# Patient Record
Sex: FEMALE | Race: WHITE | URBAN - METROPOLITAN AREA
[De-identification: names, ages, dates, MRNs, and addresses within clinical notes are randomized per-mention and may not be internally consistent; named-entity substitution may affect disease eponyms.]

---

## 2018-06-05 ENCOUNTER — ED HISTORICAL/CONVERTED ENCOUNTER (OUTPATIENT)
Dept: OTHER | Age: 73
End: 2018-06-05

## 2019-01-18 ENCOUNTER — ED HISTORICAL/CONVERTED ENCOUNTER (OUTPATIENT)
Dept: OTHER | Age: 74
End: 2019-01-18

## 2019-01-23 ENCOUNTER — ED HISTORICAL/CONVERTED ENCOUNTER (OUTPATIENT)
Dept: OTHER | Age: 74
End: 2019-01-23

## 2021-03-24 ENCOUNTER — OFFICE VISIT (OUTPATIENT)
Dept: ENT CLINIC | Age: 76
End: 2021-03-24
Payer: MEDICARE

## 2021-03-24 ENCOUNTER — OFFICE VISIT (OUTPATIENT)
Dept: ENT CLINIC | Age: 76
End: 2021-03-24

## 2021-03-24 VITALS
TEMPERATURE: 97.5 F | HEART RATE: 83 BPM | DIASTOLIC BLOOD PRESSURE: 70 MMHG | RESPIRATION RATE: 18 BRPM | SYSTOLIC BLOOD PRESSURE: 130 MMHG | OXYGEN SATURATION: 96 % | WEIGHT: 137.4 LBS

## 2021-03-24 DIAGNOSIS — H93.13 TINNITUS OF BOTH EARS: ICD-10-CM

## 2021-03-24 DIAGNOSIS — B34.9 ACUTE VIRAL SYNDROME: ICD-10-CM

## 2021-03-24 DIAGNOSIS — H93.13 TINNITUS OF BOTH EARS: Primary | ICD-10-CM

## 2021-03-24 DIAGNOSIS — J20.8 VIRAL BRONCHITIS: ICD-10-CM

## 2021-03-24 DIAGNOSIS — H90.3 SENSORINEURAL HEARING LOSS (SNHL) OF BOTH EARS: ICD-10-CM

## 2021-03-24 PROCEDURE — G8400 PT W/DXA NO RESULTS DOC: HCPCS | Performed by: OTOLARYNGOLOGY

## 2021-03-24 PROCEDURE — 3017F COLORECTAL CA SCREEN DOC REV: CPT | Performed by: OTOLARYNGOLOGY

## 2021-03-24 PROCEDURE — 92557 COMPREHENSIVE HEARING TEST: CPT | Performed by: AUDIOLOGIST

## 2021-03-24 PROCEDURE — 92567 TYMPANOMETRY: CPT | Performed by: AUDIOLOGIST

## 2021-03-24 PROCEDURE — 1101F PT FALLS ASSESS-DOCD LE1/YR: CPT | Performed by: OTOLARYNGOLOGY

## 2021-03-24 PROCEDURE — G8510 SCR DEP NEG, NO PLAN REQD: HCPCS | Performed by: OTOLARYNGOLOGY

## 2021-03-24 PROCEDURE — G8427 DOCREV CUR MEDS BY ELIG CLIN: HCPCS | Performed by: OTOLARYNGOLOGY

## 2021-03-24 PROCEDURE — G8536 NO DOC ELDER MAL SCRN: HCPCS | Performed by: OTOLARYNGOLOGY

## 2021-03-24 PROCEDURE — 1090F PRES/ABSN URINE INCON ASSESS: CPT | Performed by: OTOLARYNGOLOGY

## 2021-03-24 PROCEDURE — G8421 BMI NOT CALCULATED: HCPCS | Performed by: OTOLARYNGOLOGY

## 2021-03-24 PROCEDURE — 99204 OFFICE O/P NEW MOD 45 MIN: CPT | Performed by: OTOLARYNGOLOGY

## 2021-03-24 RX ORDER — SIMVASTATIN 20 MG/1
20 TABLET, FILM COATED ORAL AT BEDTIME
COMMUNITY

## 2021-03-24 RX ORDER — GABAPENTIN 300 MG/1
300 CAPSULE ORAL 3 TIMES DAILY
COMMUNITY

## 2021-03-24 RX ORDER — OXYCODONE HYDROCHLORIDE 10 MG/1
10 TABLET ORAL
COMMUNITY
End: 2021-09-08

## 2021-03-24 RX ORDER — METOPROLOL SUCCINATE 50 MG/1
TABLET, EXTENDED RELEASE ORAL
COMMUNITY
Start: 2021-03-20

## 2021-03-24 RX ORDER — CALCIUM CARBONATE 600 MG
600 TABLET ORAL 2 TIMES DAILY
COMMUNITY

## 2021-03-24 RX ORDER — ESTRADIOL 0.5 MG/1
0.5 TABLET ORAL DAILY
COMMUNITY

## 2021-03-24 RX ORDER — LIDOCAINE 50 MG/G
PATCH TOPICAL
COMMUNITY
Start: 2021-03-12

## 2021-03-24 RX ORDER — NORTRIPTYLINE HYDROCHLORIDE 25 MG/1
25 CAPSULE ORAL AT BEDTIME
COMMUNITY

## 2021-03-24 RX ORDER — DICLOFENAC SODIUM 10 MG/G
GEL TOPICAL
COMMUNITY

## 2021-03-24 RX ORDER — LISINOPRIL 40 MG/1
TABLET ORAL
COMMUNITY
Start: 2021-01-12

## 2021-03-24 RX ORDER — HYDROCHLOROTHIAZIDE 12.5 MG/1
TABLET ORAL
COMMUNITY
Start: 2021-01-02 | End: 2021-09-08

## 2021-03-24 RX ORDER — FAMOTIDINE 20 MG/1
20 TABLET, FILM COATED ORAL 2 TIMES DAILY
COMMUNITY

## 2021-03-24 NOTE — LETTER
3/24/2021 Patient: Kristy Márquez YOB: 1945 Date of Visit: 3/24/2021 Alfonso Soto MD 
[address] [place] [address] [phone] Via Fax: [phone] Dear Alfonso Soto MD, Thank you for referring Ms. Cody Healy to [facility] for evaluation. My notes for this consultation are attached. If you have questions, please do not hesitate to call me. I look forward to following your patient along with you. Sincerely, Edith Carias MD

## 2021-03-24 NOTE — PROGRESS NOTES
Subjective:    Stephanie Cunha   78 y.o.   1945     New Patient Visit    Location - ear, throat    Quality - tinnitus, sore throat    Severity -  moderate    Duration - 3-4 days    Timing - acute onset (with throat)    Context - pt with longstanding roaring tinnitus, poor hearing; no noise exposure; states having sore throat and deep cough, not too productive; no tobacco use, + second hand smoke; ears burning    Modifying Features - ear worse with her recent symptoms    Associated symptoms/signs - neuropathy, restless legs      Review of Systems  Review of Systems   Constitutional: Negative for chills and fever. HENT: Positive for hearing loss, sinus pain, sore throat and tinnitus. Negative for ear pain and nosebleeds. Eyes: Negative for blurred vision and double vision. Respiratory: Positive for cough. Negative for sputum production and shortness of breath. Cardiovascular: Negative for chest pain and palpitations. Gastrointestinal: Negative for heartburn, nausea and vomiting. Musculoskeletal: Negative for joint pain and neck pain. Skin: Negative. Neurological: Negative for dizziness, speech change, weakness and headaches. Endo/Heme/Allergies: Negative for environmental allergies. Does not bruise/bleed easily. Psychiatric/Behavioral: Negative for memory loss. The patient does not have insomnia.           Past Medical History:   Diagnosis Date    Back pain     Herniated disc, cervical     Hyperlipemia     Hypertension     Osteoarthritis     Scoliosis     Shingles     Shoulder pain     Spinal stenosis     Torn meniscus     Vaginal prolapse     Vertigo      Past Surgical History:   Procedure Laterality Date    HX HYSTERECTOMY        Family History   Problem Relation Age of Onset    Cancer Mother     Heart Disease Brother      Social History     Tobacco Use    Smoking status: Never Smoker    Smokeless tobacco: Never Used   Substance Use Topics    Alcohol use: Not on file Prior to Admission medications    Medication Sig Start Date End Date Taking? Authorizing Provider   gabapentin (NEURONTIN) 300 mg capsule Take 300 mg by mouth three (3) times daily. Yes Provider, Historical   lisinopriL (PRINIVIL, ZESTRIL) 40 mg tablet  1/12/21  Yes Provider, Historical   simvastatin (ZOCOR) 20 mg tablet Take 20 mg by mouth At bedtime. Yes Provider, Historical   diclofenac (VOLTAREN) 1 % gel by TransDERmal route. Yes Provider, Historical   hydroCHLOROthiazide (HYDRODIURIL) 12.5 mg tablet  1/2/21  Yes Provider, Historical   lidocaine (LIDODERM) 5 %  3/12/21  Yes Provider, Historical   nortriptyline (PAMELOR) 25 mg capsule Take 25 mg by mouth At bedtime. Yes Provider, Historical   calcium carbonate (CALTREX) 600 mg calcium (1,500 mg) tablet Take 600 mg by mouth two (2) times a day. Yes Provider, Historical   oxyCODONE IR (ROXICODONE) 10 mg tab immediate release tablet Take 10 mg by mouth every six (6) hours as needed for Pain. Yes Provider, Historical   metoprolol succinate (TOPROL-XL) 50 mg XL tablet  3/20/21  Yes Provider, Historical   famotidine (PEPCID) 20 mg tablet Take 20 mg by mouth two (2) times a day. Yes Provider, Historical   codeine-guaifenesin  mg/5 mL liqd Take 5 mL by mouth three (3) times daily as needed for Cough for up to 5 days. Max Daily Amount: 15 mL. 3/24/21 3/29/21 Yes Alen Crow MD   estradioL (ESTRACE) 0.5 mg tablet Take 0.5 mg by mouth daily. Provider, Historical        Allergies   Allergen Reactions    Ciprofloxacin Rash    Penicillins Rash         Objective:     Visit Vitals  /70   Pulse 83   Temp 97.5 °F (36.4 °C)   Resp 18   Wt 137 lb 6.4 oz (62.3 kg)   SpO2 96%        Physical Exam  Vitals signs reviewed. Constitutional:       General: She is awake. She is not in acute distress. Appearance: Normal appearance. She is well-groomed and normal weight. HENT:      Head: Normocephalic and atraumatic. Jaw:  There is normal jaw occlusion. No trismus, tenderness or malocclusion. Salivary Glands: Right salivary gland is not diffusely enlarged or tender. Left salivary gland is not diffusely enlarged or tender. Right Ear: Tympanic membrane, ear canal and external ear normal. Decreased hearing noted. Left Ear: Tympanic membrane, ear canal and external ear normal. Decreased hearing noted. Ears:      De Guzman exam findings: does not lateralize. Right Rinne: AC > BC. Left Rinne: AC > BC. Nose: Mucosal edema present. No nasal deformity or septal deviation. Right Nostril: No epistaxis. Left Nostril: No epistaxis. Right Turbinates: Not enlarged, swollen or pale. Left Turbinates: Not enlarged, swollen or pale. Right Sinus: No maxillary sinus tenderness or frontal sinus tenderness. Left Sinus: No maxillary sinus tenderness or frontal sinus tenderness. Mouth/Throat:      Lips: Pink. No lesions. Mouth: Mucous membranes are moist. No oral lesions. Dentition: Normal dentition. No dental caries. Tongue: No lesions. Palate: No mass and lesions. Pharynx: Oropharynx is clear. Uvula midline. No oropharyngeal exudate or posterior oropharyngeal erythema. Tonsils: No tonsillar exudate. 0 on the right. 0 on the left. Eyes:      General: Vision grossly intact. Extraocular Movements: Extraocular movements intact. Right eye: No nystagmus. Left eye: No nystagmus. Conjunctiva/sclera: Conjunctivae normal.      Pupils: Pupils are equal, round, and reactive to light. Neck:      Musculoskeletal: Muscular tenderness present. No edema or erythema. Thyroid: No thyroid mass, thyromegaly or thyroid tenderness. Trachea: Trachea and phonation normal. No tracheal tenderness or tracheal deviation. Cardiovascular:      Rate and Rhythm: Normal rate and regular rhythm. Pulmonary:      Effort: Pulmonary effort is normal. No respiratory distress.       Breath sounds: No stridor. Musculoskeletal: Normal range of motion. General: No swelling or tenderness. Lymphadenopathy:      Cervical: No cervical adenopathy. Skin:     General: Skin is warm and dry. Findings: No lesion or rash. Neurological:      General: No focal deficit present. Mental Status: She is alert and oriented to person, place, and time. Mental status is at baseline. Cranial Nerves: Cranial nerves are intact. Coordination: Romberg sign negative. Gait: Gait is intact. Psychiatric:         Mood and Affect: Mood normal.         Behavior: Behavior normal. Behavior is cooperative. Assessment/Plan:     Encounter Diagnoses   Name Primary?  Tinnitus of both ears Yes    Acute viral syndrome     Viral bronchitis     Sensorineural hearing loss (SNHL) of both ears      Exam is overall normal today, patient reassured    Symptoms are consistent with an acute viral syndrome. Likely a viral bronchitis. She does not have any other symptoms that would be concerning for Covid therefore she does not need a test for this however if she develops rhinorrhea, fever, change in sense of smell and taste I have urged her to go to urgent care. She has longstanding tinnitus, audiogram today showing - relatively flat moderate to severe SN loss AU, no asymmetry. She is a good hearing aid candidate, gm for tinnitus masking.     Orders Placed This Encounter    REFERRAL TO AUDIOLOGY    gabapentin (NEURONTIN) 300 mg capsule    lisinopriL (PRINIVIL, ZESTRIL) 40 mg tablet    simvastatin (ZOCOR) 20 mg tablet    diclofenac (VOLTAREN) 1 % gel    hydroCHLOROthiazide (HYDRODIURIL) 12.5 mg tablet    lidocaine (LIDODERM) 5 %    nortriptyline (PAMELOR) 25 mg capsule    calcium carbonate (CALTREX) 600 mg calcium (1,500 mg) tablet    oxyCODONE IR (ROXICODONE) 10 mg tab immediate release tablet    metoprolol succinate (TOPROL-XL) 50 mg XL tablet    estradioL (ESTRACE) 0.5 mg tablet  famotidine (PEPCID) 20 mg tablet    codeine-guaifenesin  mg/5 mL liqd     Follow-up and Dispositions    · Return if symptoms worsen or fail to improve. Follow-up and Disposition History            Thank you for referring this patient,    Alen Artis MD, [address]  [address] [place] [address] [address]  [facility] [phone]

## 2021-03-24 NOTE — PROGRESS NOTES
Stephanie Cunha, a 78y.o. year old female, was seen in ENT clinic today for a hearing evaluation on referral from Dr. Jonel Saxena. Patient complains of hearing loss and \"roaring\" tinnitus in both ears. She also reports episodes of imbalance/vertigo; reports that Antivert prescribed by her PCP works to resolve these episodes. She reports that her last hearing test was \"many years ago\" and at that point hearing loss was identified but no treatment was recommended. She reports difficulty understanding speech. Otoscopy: normal external ear canals and visible tympanic membranes, bilaterally. Tympanometry: RE unable to obtain; could not maintain seal  LE Type A, normal    SRT: RE Speech Reception Threshold (SRT) was obtained at 40 dBHL LE Speech Reception Threshold (SRT) was obtained at 45 dBHL    WRS: RE Good in quiet when words were presented at 80 dBHL. WRS: LE Fair in quiet when words were presented at 85 dBHL. Pure tone audiometry:  RE: mild sloping to moderate-severe sensorineural hearing loss  LE: moderate sloping to moderate-severe sensorineural hearing loss    Sensorineural hearing loss, bilaterally    Impressions:  hearing loss requiring medical/otologic and audiologic follow-up  Discussed results of today's testing with patient. Explained relationship between tinnitus and hearing loss; discussed patient's difficulties in everyday conversation. Recommended hearing aid evaluation to discuss potential options for amplification; patient agreed. Also discussed potential for tinnitus masking by using amplification. Provided handouts for tinnitus and hearing aid evaluation today. Patient to schedule hearing aid evaluation at her convenience. Plan:  Follow-up with ENT.   Repeat audiogram upon request.  Hearing aid evaluation upon request.    Frankie Daley   Doctor of Audiology

## 2021-03-25 ENCOUNTER — TELEPHONE (OUTPATIENT)
Dept: ENT CLINIC | Age: 76
End: 2021-03-25

## 2021-03-25 DIAGNOSIS — B34.9 ACUTE VIRAL SYNDROME: ICD-10-CM

## 2021-03-25 DIAGNOSIS — J20.8 VIRAL BRONCHITIS: ICD-10-CM

## 2021-09-05 ENCOUNTER — APPOINTMENT (OUTPATIENT)
Dept: GENERAL RADIOLOGY | Age: 76
DRG: 682 | End: 2021-09-05
Attending: EMERGENCY MEDICINE
Payer: MEDICARE

## 2021-09-05 ENCOUNTER — APPOINTMENT (OUTPATIENT)
Dept: CT IMAGING | Age: 76
DRG: 682 | End: 2021-09-05
Attending: EMERGENCY MEDICINE
Payer: MEDICARE

## 2021-09-05 ENCOUNTER — HOSPITAL ENCOUNTER (INPATIENT)
Age: 76
LOS: 2 days | Discharge: HOME | DRG: 682 | End: 2021-09-08
Attending: EMERGENCY MEDICINE | Admitting: INTERNAL MEDICINE
Payer: MEDICARE

## 2021-09-05 DIAGNOSIS — E87.6 HYPOKALEMIA: ICD-10-CM

## 2021-09-05 DIAGNOSIS — R41.82 ALTERED MENTAL STATUS, UNSPECIFIED ALTERED MENTAL STATUS TYPE: Primary | ICD-10-CM

## 2021-09-05 LAB
ALBUMIN SERPL-MCNC: 3.1 G/DL (ref 3.5–5)
ALBUMIN/GLOB SERPL: 0.8 {RATIO} (ref 1.1–2.2)
ALP SERPL-CCNC: 74 U/L (ref 45–117)
ALT SERPL-CCNC: 19 U/L (ref 12–78)
AMPHET UR QL SCN: NEGATIVE
ANION GAP SERPL CALC-SCNC: 11 MMOL/L (ref 5–15)
APPEARANCE UR: ABNORMAL
ARTERIAL PATENCY WRIST A: ABNORMAL
AST SERPL W P-5'-P-CCNC: 13 U/L (ref 15–37)
BACTERIA URNS QL MICRO: NEGATIVE /HPF
BARBITURATES UR QL SCN: NEGATIVE
BASE DEFICIT BLDA-SCNC: 1.1 MMOL/L (ref 0–2)
BASOPHILS # BLD: 0 K/UL (ref 0–0.1)
BASOPHILS NFR BLD: 0 % (ref 0–1)
BDY SITE: ABNORMAL
BENZODIAZ UR QL: NEGATIVE
BILIRUB SERPL-MCNC: 1 MG/DL (ref 0.2–1)
BILIRUB UR QL: NEGATIVE
BNP SERPL-MCNC: 462 PG/ML
BUN SERPL-MCNC: 41 MG/DL (ref 6–20)
BUN/CREAT SERPL: 17 (ref 12–20)
CA-I BLD-MCNC: 9.1 MG/DL (ref 8.5–10.1)
CANNABINOIDS UR QL SCN: NEGATIVE
CHLORIDE SERPL-SCNC: 99 MMOL/L (ref 97–108)
CO2 SERPL-SCNC: 23 MMOL/L (ref 21–32)
COCAINE UR QL SCN: NEGATIVE
COLOR UR: ABNORMAL
CREAT SERPL-MCNC: 2.46 MG/DL (ref 0.55–1.02)
DIFFERENTIAL METHOD BLD: ABNORMAL
DRUG SCRN COMMENT,DRGCM: ABNORMAL
EOSINOPHIL # BLD: 0.2 K/UL (ref 0–0.4)
EOSINOPHIL NFR BLD: 1 % (ref 0–7)
ERYTHROCYTE [DISTWIDTH] IN BLOOD BY AUTOMATED COUNT: 13 % (ref 11.5–14.5)
FIO2 ON VENT: 21 %
GLOBULIN SER CALC-MCNC: 3.9 G/DL (ref 2–4)
GLUCOSE SERPL-MCNC: 162 MG/DL (ref 65–100)
GLUCOSE UR STRIP.AUTO-MCNC: NEGATIVE MG/DL
HCO3 BLDA-SCNC: 22 MMOL/L (ref 22–26)
HCT VFR BLD AUTO: 32.1 % (ref 35–47)
HGB BLD-MCNC: 11.3 G/DL (ref 11.5–16)
HGB UR QL STRIP: NEGATIVE
HYALINE CASTS URNS QL MICRO: ABNORMAL /LPF (ref 0–5)
IMM GRANULOCYTES # BLD AUTO: 0.1 K/UL (ref 0–0.04)
IMM GRANULOCYTES NFR BLD AUTO: 1 % (ref 0–0.5)
KETONES UR QL STRIP.AUTO: NEGATIVE MG/DL
LACTATE SERPL-SCNC: 1.2 MMOL/L (ref 0.4–2)
LEUKOCYTE ESTERASE UR QL STRIP.AUTO: NEGATIVE
LYMPHOCYTES # BLD: 1.3 K/UL (ref 0.8–3.5)
LYMPHOCYTES NFR BLD: 8 % (ref 12–49)
MAGNESIUM SERPL-MCNC: 2.3 MG/DL (ref 1.6–2.4)
MCH RBC QN AUTO: 32.1 PG (ref 26–34)
MCHC RBC AUTO-ENTMCNC: 35.2 G/DL (ref 30–36.5)
MCV RBC AUTO: 91.2 FL (ref 80–99)
METHADONE UR QL: NEGATIVE
MONOCYTES # BLD: 1.4 K/UL (ref 0–1)
MONOCYTES NFR BLD: 8 % (ref 5–13)
MUCOUS THREADS URNS QL MICRO: ABNORMAL /LPF
NEUTS SEG # BLD: 14.2 K/UL (ref 1.8–8)
NEUTS SEG NFR BLD: 82 % (ref 32–75)
NITRITE UR QL STRIP.AUTO: NEGATIVE
NRBC # BLD: 0 K/UL (ref 0–0.01)
NRBC BLD-RTO: 0 PER 100 WBC
OPIATES UR QL: POSITIVE
PCO2 BLDA: 29 MMHG (ref 35–45)
PCP UR QL: NEGATIVE
PH BLDA: 7.48 [PH] (ref 7.35–7.45)
PH UR STRIP: 5 [PH] (ref 5–8)
PLATELET # BLD AUTO: 260 K/UL (ref 150–400)
PMV BLD AUTO: 10.5 FL (ref 8.9–12.9)
PO2 BLDA: 148 MMHG (ref 75–100)
POTASSIUM SERPL-SCNC: 2.6 MMOL/L (ref 3.5–5.1)
PROCALCITONIN SERPL-MCNC: 1.28 NG/ML
PROT SERPL-MCNC: 7 G/DL (ref 6.4–8.2)
PROT UR STRIP-MCNC: 100 MG/DL
RBC # BLD AUTO: 3.52 M/UL (ref 3.8–5.2)
RBC #/AREA URNS HPF: ABNORMAL /HPF (ref 0–5)
SAO2 % BLD: >100 %
SAO2% DEVICE SAO2% SENSOR NAME: ABNORMAL
SODIUM SERPL-SCNC: 133 MMOL/L (ref 136–145)
SP GR UR REFRACTOMETRY: 1.02 (ref 1–1.03)
TROPONIN I SERPL-MCNC: <0.05 NG/ML
TSH SERPL DL<=0.05 MIU/L-ACNC: 0.31 UIU/ML (ref 0.36–3.74)
UA: UC IF INDICATED,UAUC: ABNORMAL
UROBILINOGEN UR QL STRIP.AUTO: 0.1 EU/DL (ref 0.1–1)
WBC # BLD AUTO: 17.2 K/UL (ref 3.6–11)
WBC URNS QL MICRO: ABNORMAL /HPF (ref 0–4)

## 2021-09-05 PROCEDURE — 71045 X-RAY EXAM CHEST 1 VIEW: CPT

## 2021-09-05 PROCEDURE — 82803 BLOOD GASES ANY COMBINATION: CPT

## 2021-09-05 PROCEDURE — 85025 COMPLETE CBC W/AUTO DIFF WBC: CPT

## 2021-09-05 PROCEDURE — 83605 ASSAY OF LACTIC ACID: CPT

## 2021-09-05 PROCEDURE — 81001 URINALYSIS AUTO W/SCOPE: CPT

## 2021-09-05 PROCEDURE — 74176 CT ABD & PELVIS W/O CONTRAST: CPT

## 2021-09-05 PROCEDURE — 96367 TX/PROPH/DG ADDL SEQ IV INF: CPT

## 2021-09-05 PROCEDURE — 84484 ASSAY OF TROPONIN QUANT: CPT

## 2021-09-05 PROCEDURE — 80053 COMPREHEN METABOLIC PANEL: CPT

## 2021-09-05 PROCEDURE — 96375 TX/PRO/DX INJ NEW DRUG ADDON: CPT

## 2021-09-05 PROCEDURE — 87040 BLOOD CULTURE FOR BACTERIA: CPT

## 2021-09-05 PROCEDURE — 70450 CT HEAD/BRAIN W/O DYE: CPT

## 2021-09-05 PROCEDURE — 96366 THER/PROPH/DIAG IV INF ADDON: CPT

## 2021-09-05 PROCEDURE — 84443 ASSAY THYROID STIM HORMONE: CPT

## 2021-09-05 PROCEDURE — 80307 DRUG TEST PRSMV CHEM ANLYZR: CPT

## 2021-09-05 PROCEDURE — 74011250636 HC RX REV CODE- 250/636: Performed by: EMERGENCY MEDICINE

## 2021-09-05 PROCEDURE — 83735 ASSAY OF MAGNESIUM: CPT

## 2021-09-05 PROCEDURE — 83880 ASSAY OF NATRIURETIC PEPTIDE: CPT

## 2021-09-05 PROCEDURE — 74011250637 HC RX REV CODE- 250/637: Performed by: EMERGENCY MEDICINE

## 2021-09-05 PROCEDURE — 99285 EMERGENCY DEPT VISIT HI MDM: CPT

## 2021-09-05 PROCEDURE — 96365 THER/PROPH/DIAG IV INF INIT: CPT

## 2021-09-05 PROCEDURE — 84145 PROCALCITONIN (PCT): CPT

## 2021-09-05 PROCEDURE — 36415 COLL VENOUS BLD VENIPUNCTURE: CPT

## 2021-09-05 PROCEDURE — 93005 ELECTROCARDIOGRAM TRACING: CPT

## 2021-09-05 PROCEDURE — 36600 WITHDRAWAL OF ARTERIAL BLOOD: CPT

## 2021-09-05 RX ORDER — NALOXONE HYDROCHLORIDE 1 MG/ML
0.4 INJECTION INTRAMUSCULAR; INTRAVENOUS; SUBCUTANEOUS
Status: COMPLETED | OUTPATIENT
Start: 2021-09-05 | End: 2021-09-05

## 2021-09-05 RX ORDER — POTASSIUM CHLORIDE 7.45 MG/ML
10 INJECTION INTRAVENOUS ONCE
Status: COMPLETED | OUTPATIENT
Start: 2021-09-05 | End: 2021-09-06

## 2021-09-05 RX ORDER — POTASSIUM CHLORIDE 7.45 MG/ML
10 INJECTION INTRAVENOUS
Status: DISPENSED | OUTPATIENT
Start: 2021-09-05 | End: 2021-09-05

## 2021-09-05 RX ORDER — POTASSIUM CHLORIDE 1.5 G/1.77G
40 POWDER, FOR SOLUTION ORAL ONCE
Status: COMPLETED | OUTPATIENT
Start: 2021-09-05 | End: 2021-09-05

## 2021-09-05 RX ADMIN — SODIUM CHLORIDE 1000 ML: 9 INJECTION, SOLUTION INTRAVENOUS at 19:00

## 2021-09-05 RX ADMIN — POTASSIUM CHLORIDE 10 MEQ: 7.46 INJECTION, SOLUTION INTRAVENOUS at 23:44

## 2021-09-05 RX ADMIN — POTASSIUM CHLORIDE 40 MEQ: 1.5 FOR SOLUTION ORAL at 23:54

## 2021-09-05 RX ADMIN — NALOXONE HYDROCHLORIDE 0.4 MG: 1 INJECTION PARENTERAL at 17:42

## 2021-09-05 RX ADMIN — POTASSIUM CHLORIDE 10 MEQ: 7.46 INJECTION, SOLUTION INTRAVENOUS at 19:21

## 2021-09-05 RX ADMIN — POTASSIUM CHLORIDE 10 MEQ: 7.46 INJECTION, SOLUTION INTRAVENOUS at 22:13

## 2021-09-05 RX ADMIN — POTASSIUM CHLORIDE 10 MEQ: 7.46 INJECTION, SOLUTION INTRAVENOUS at 17:42

## 2021-09-05 NOTE — ED TRIAGE NOTES
bs 210, family called for ams, pt lethargic, lkw 0300 this am, resp even/unlabored. 4mg intranasal narcan given.

## 2021-09-05 NOTE — ED PROVIDER NOTES
EMERGENCY DEPARTMENT HISTORY AND PHYSICAL EXAM      Date: 9/5/2021  Patient Name: Rodolfo Angulo      History of Presenting Illness     Chief Complaint   Patient presents with    Altered mental status       History Provided By: Patient, Patient's Daughter and EMS    HPI: Rodoflo Angulo, 78 y.o. female with a past medical history significant hypertension and hyperlipidemia presents to the ED with cc of altered mental status. According to family, patient was found sleeping on the floor. She they were unable to arouse her. He states that she has been having diarrhea over the last couple of days however has not had any other complaints. There are no other complaints, changes, or physical findings at this time. PCP: Reji Pablo MD    Current Facility-Administered Medications   Medication Dose Route Frequency Provider Last Rate Last Admin    oxyCODONE IR (ROXICODONE) tablet 10 mg  10 mg Oral NOW Luz Chilel MD         Current Outpatient Medications   Medication Sig Dispense Refill    gabapentin (NEURONTIN) 300 mg capsule Take 300 mg by mouth three (3) times daily.  lisinopriL (PRINIVIL, ZESTRIL) 40 mg tablet       simvastatin (ZOCOR) 20 mg tablet Take 20 mg by mouth At bedtime.  diclofenac (VOLTAREN) 1 % gel by TransDERmal route.  hydroCHLOROthiazide (HYDRODIURIL) 12.5 mg tablet       lidocaine (LIDODERM) 5 %       nortriptyline (PAMELOR) 25 mg capsule Take 25 mg by mouth At bedtime.  calcium carbonate (CALTREX) 600 mg calcium (1,500 mg) tablet Take 600 mg by mouth two (2) times a day.  oxyCODONE IR (ROXICODONE) 10 mg tab immediate release tablet Take 10 mg by mouth every six (6) hours as needed for Pain.  metoprolol succinate (TOPROL-XL) 50 mg XL tablet       estradioL (ESTRACE) 0.5 mg tablet Take 0.5 mg by mouth daily.  famotidine (PEPCID) 20 mg tablet Take 20 mg by mouth two (2) times a day.          Past History     Past Medical History:  Past Medical History: Diagnosis Date    Back pain     Herniated disc, cervical     Hyperlipemia     Hypertension     Osteoarthritis     Scoliosis     Shingles     Shoulder pain     Spinal stenosis     Torn meniscus     Vaginal prolapse     Vertigo        Past Surgical History:  Past Surgical History:   Procedure Laterality Date    HX HYSTERECTOMY         Family History:  Family History   Problem Relation Age of Onset    Cancer Mother     Heart Disease Brother        Social History:  Social History     Tobacco Use    Smoking status: Never Smoker    Smokeless tobacco: Never Used   Vaping Use    Vaping Use: Never used   Substance Use Topics    Alcohol use: Not on file    Drug use: Never       Allergies: Allergies   Allergen Reactions    Ciprofloxacin Rash    Penicillins Rash         Review of Systems     Review of Systems   Constitutional: Negative for chills and fever. HENT: Negative for congestion and sore throat. Eyes: Negative for pain and visual disturbance. Respiratory: Negative for cough and shortness of breath. Cardiovascular: Negative for chest pain and palpitations. Gastrointestinal: Positive for abdominal pain and diarrhea. Negative for constipation, nausea and vomiting. Endocrine: Negative for cold intolerance and heat intolerance. Genitourinary: Positive for difficulty urinating. Negative for dysuria and frequency. Musculoskeletal: Negative for arthralgias and myalgias. Skin: Negative for color change and rash. Allergic/Immunologic: Negative for environmental allergies and food allergies. Neurological: Negative for dizziness, weakness, light-headedness and headaches. Hematological: Does not bruise/bleed easily. Psychiatric/Behavioral: Negative for agitation and confusion. Physical Exam     Physical Exam  Constitutional:       Appearance: Normal appearance. HENT:      Head: Normocephalic and atraumatic.       Right Ear: External ear normal.      Left Ear: External ear normal.      Nose: Nose normal.      Mouth/Throat:      Mouth: Mucous membranes are moist.   Eyes:      Extraocular Movements: Extraocular movements intact. Conjunctiva/sclera: Conjunctivae normal.      Pupils: Pupils are equal, round, and reactive to light. Comments: 2 mm bilaterally   Cardiovascular:      Rate and Rhythm: Normal rate and regular rhythm. Pulses: Normal pulses. Heart sounds: Normal heart sounds. Pulmonary:      Effort: Pulmonary effort is normal.      Breath sounds: Normal breath sounds. Abdominal:      General: Abdomen is flat. There is no distension. Palpations: Abdomen is soft. Tenderness: There is no abdominal tenderness. Musculoskeletal:         General: Normal range of motion. Cervical back: Normal range of motion. Skin:     General: Skin is warm and dry. Capillary Refill: Capillary refill takes less than 2 seconds. Neurological:      General: No focal deficit present. Mental Status: She is alert and oriented to person, place, and time. Mental status is at baseline. Psychiatric:         Behavior: Behavior is agitated. Judgment: Judgment is impulsive. Lab and Diagnostic Study Results     Labs -     Recent Results (from the past 12 hour(s))   CBC WITH AUTOMATED DIFF    Collection Time: 09/05/21  3:35 PM   Result Value Ref Range    WBC 17.2 (H) 3.6 - 11.0 K/uL    RBC 3.52 (L) 3.80 - 5.20 M/uL    HGB 11.3 (L) 11.5 - 16.0 g/dL    HCT 32.1 (L) 35.0 - 47.0 %    MCV 91.2 80.0 - 99.0 FL    MCH 32.1 26.0 - 34.0 PG    MCHC 35.2 30.0 - 36.5 g/dL    RDW 13.0 11.5 - 14.5 %    PLATELET 965 618 - 814 K/uL    MPV 10.5 8.9 - 12.9 FL    NRBC 0.0 0.0  WBC    ABSOLUTE NRBC 0.00 0.00 - 0.01 K/uL    NEUTROPHILS 82 (H) 32 - 75 %    LYMPHOCYTES 8 (L) 12 - 49 %    MONOCYTES 8 5 - 13 %    EOSINOPHILS 1 0 - 7 %    BASOPHILS 0 0 - 1 %    IMMATURE GRANULOCYTES 1 (H) 0 - 0.5 %    ABS. NEUTROPHILS 14.2 (H) 1.8 - 8.0 K/UL    ABS.  LYMPHOCYTES 1.3 0.8 - 3.5 K/UL    ABS. MONOCYTES 1.4 (H) 0.0 - 1.0 K/UL    ABS. EOSINOPHILS 0.2 0.0 - 0.4 K/UL    ABS. BASOPHILS 0.0 0.0 - 0.1 K/UL    ABS. IMM. GRANS. 0.1 (H) 0.00 - 0.04 K/UL    DF AUTOMATED     METABOLIC PANEL, COMPREHENSIVE    Collection Time: 09/05/21  3:35 PM   Result Value Ref Range    Sodium 133 (L) 136 - 145 mmol/L    Potassium 2.6 (LL) 3.5 - 5.1 mmol/L    Chloride 99 97 - 108 mmol/L    CO2 23 21 - 32 mmol/L    Anion gap 11 5 - 15 mmol/L    Glucose 162 (H) 65 - 100 mg/dL    BUN 41 (H) 6 - 20 mg/dL    Creatinine 2.46 (H) 0.55 - 1.02 mg/dL    BUN/Creatinine ratio 17 12 - 20      GFR est AA 23 (L) >60 ml/min/1.73m2    GFR est non-AA 19 (L) >60 ml/min/1.73m2    Calcium 9.1 8.5 - 10.1 mg/dL    Bilirubin, total 1.0 0.2 - 1.0 mg/dL    AST (SGOT) 13 (L) 15 - 37 U/L    ALT (SGPT) 19 12 - 78 U/L    Alk.  phosphatase 74 45 - 117 U/L    Protein, total 7.0 6.4 - 8.2 g/dL    Albumin 3.1 (L) 3.5 - 5.0 g/dL    Globulin 3.9 2.0 - 4.0 g/dL    A-G Ratio 0.8 (L) 1.1 - 2.2     TROPONIN I    Collection Time: 09/05/21  3:35 PM   Result Value Ref Range    Troponin-I, Qt. <0.05 <0.05 ng/mL   BNP    Collection Time: 09/05/21  3:35 PM   Result Value Ref Range    NT pro- (H) <125 pg/mL   MAGNESIUM    Collection Time: 09/05/21  3:35 PM   Result Value Ref Range    Magnesium 2.3 1.6 - 2.4 mg/dL   TSH 3RD GENERATION    Collection Time: 09/05/21  3:35 PM   Result Value Ref Range    TSH 0.31 (L) 0.36 - 3.74 uIU/mL   PROCALCITONIN    Collection Time: 09/05/21  3:35 PM   Result Value Ref Range    Procalcitonin 1.28 (H) 0 ng/mL   LACTIC ACID    Collection Time: 09/05/21  3:35 PM   Result Value Ref Range    Lactic acid 1.2 0.4 - 2.0 mmol/L   BLOOD GAS, ARTERIAL    Collection Time: 09/05/21  3:35 PM   Result Value Ref Range    pH 7.48 (H) 7.35 - 7.45      PCO2 29 (L) 35 - 45 mmHg    PO2 148 (H) 75 - 100 mmHg    O2 SAT >100 >95 %    BICARBONATE 22 22 - 26 mmol/L    BASE DEFICIT 1.1 0 - 2 mmol/L    O2 METHOD Room air      FIO2 21.0 %    SITE Right Radial      HAROON'S TEST PASS     URINALYSIS W/ REFLEX CULTURE    Collection Time: 09/05/21  4:00 PM    Specimen: Urine   Result Value Ref Range    Color Faith      Appearance Turbid (A) Clear      Specific gravity 1.023 1.003 - 1.030      pH (UA) 5.0 5.0 - 8.0      Protein 100 (A) Negative mg/dL    Glucose Negative Negative mg/dL    Ketone Negative Negative mg/dL    Bilirubin Negative Negative      Blood Negative Negative      Urobilinogen 0.1 0.1 - 1.0 EU/dL    Nitrites Negative Negative      Leukocyte Esterase Negative Negative      WBC 5-10 0 - 4 /hpf    RBC 0-5 0 - 5 /hpf    Bacteria Negative Negative /hpf    UA:UC IF INDICATED Culture not indicated by UA result Culture not indicated by UA result      Mucus Trace (A) Negative /lpf    Hyaline cast 5-10 0 - 5 /lpf   DRUG SCREEN, URINE    Collection Time: 09/05/21  4:00 PM   Result Value Ref Range    AMPHETAMINES Negative Negative      BARBITURATES Negative Negative      BENZODIAZEPINES Negative Negative      COCAINE Negative Negative      METHADONE Negative Negative      OPIATES Positive (A) Negative      PCP(PHENCYCLIDINE) Negative Negative      THC (TH-CANNABINOL) Negative Negative      Drug screen comment        This test is a screen for drugs of abuse in a medical setting only (i.e., they are unconfirmed results and as such must not be used for non-medical purposes, e.g.,employment testing, legal testing). Due to its inherent nature, false positive (FP) and false negative (FN) results may be obtained. Therefore, if necessary for medical care, recommend confirmation of positive findings by GC/MS. Radiologic Studies -   [unfilled]  CT Results  (Last 48 hours)               09/05/21 1805  CT ABD PELV WO CONT Final result    Impression:  No active findings       CT dose reduction was achieved through use of a standardized protocol tailored   for this examination and automatic exposure control for dose modulation.        Narrative:  Noncontrast study shows mild atelectasis in the lung bases       Small cyst in the liver tip. Spleen, pancreas, and borderline distended   gallbladder, adrenals and kidneys are normal. No small bowel or vascular   abnormality, lymphadenopathy or free fluid       Uterus is out. No mass or free fluid. Normal bladder and distal colon. Proximal   colon shows fat replacement of the wall. . No adjacent fat edema. High density   stool throughout,: Mostly collapsed. Normal appendix. No mesenteric fat edema. Small fat-containing umbilical hernia       Advanced degenerative changes and severe scoliosis           09/05/21 1640  CT HEAD WO CONT Final result    Impression:  1. There are smaller punctate foci of CSF density demonstrated along the   anterior limb of the right internal capsule and within the right anterior   periventricular/centrum semiovale white matter most compatible with tiny old   lacunar infarcts. 2.  There are milder microvascular changes with a slight interval progression   when compared to prior imaging. 3.  This examination is negative for acute intracranial pathology. Narrative: The study is a noncontrasted CT examination of the head dated 9/5/2021. HISTORY: Altered mental status. TECHNIQUE: Thin section axial imaging was performed followed by sagittal and   coronal reconstructed imaging. Dose Reduction Technique was employed to reduce radiation exposure - This   includes reduction optimization techniques as appropriate to a performed exam   with automated exposure control adjustments of the mA and/or Kv according to   patient size, or use of iterative reconstruction technique. COMPARISON: CT evaluation of the head dated 6/5/2018. The ventricles are normal in size with a midline position. This examination is   negative for intracranial hemorrhage, mass effect or an extra axial collection.    The gray-white matter junctions are preserved without cytotoxic or vasogenic   edema. There is a tiny fluid density collection along the anterior limb of the right   internal capsule (series 201 image number 17). These findings are suspect for a   tiny old lacunar infarct versus a prominent perivascular space. There is a similar area within the right anterior periventricular white matter   (series 201 image number 20) most compatible with a small older lacunar infarct. There has been a mild interval progression in the microvascular changes. There   is milder microvascular disease. ORBITS: This examination is negative for acute orbital pathology. PARANASAL SINUSES: This examination is negative for acute sinus disease. The mastoid air cells and middle ear cavities image in a normal fashion. The   sella and the suprasellar regions are unremarkable. The craniocervical junction   images in a normal fashion. OTHER: None. CXR Results  (Last 48 hours)               09/05/21 1605  XR CHEST SNGL V Final result    Impression:  Findings/impression:       Slightly enlarged cardiac size. Clear lungs. No infiltrate. No overt edema. No   pneumothorax or effusion. Extensive mitral valve calcifications are present;   needs clinical correlation. No definite acute cardiopulmonary process noted. Narrative:  Portable AP upright chest x-ray:       No priors. Medical Decision Making and ED Course   - I am the first and primary provider for this patient AND AM THE PRIMARY PROVIDER OF RECORD. - I reviewed the vital signs, available nursing notes, past medical history, past surgical history, family history and social history. - Initial assessment performed. The patients presenting problems have been discussed, and the staff are in agreement with the care plan formulated and outlined with them. I have encouraged them to ask questions as they arise throughout their visit.     Vital Signs-Reviewed the patient's vital signs. Patient Vitals for the past 12 hrs:   Temp Pulse Resp BP SpO2   09/05/21 2346  77 17 (!) 154/65 100 %   09/05/21 2214  70 18 (!) 127/59 98 %   09/05/21 2126  68 16 (!) 104/58 97 %   09/05/21 1925  71 18 98/72 99 %   09/05/21 1748  80 18 (!) 114/50 100 %   09/05/21 1520 97.3 °F (36.3 °C) 75 18 (!) 121/45 97 %       EKG interpretation: (Preliminary): Performed at 1543, and read at 1549  Normal sinus rhythm with ventricular rate of 65, , , QTc 428 without evidence of ST depression or elevation. The patient presents with altered mental status with a differential diagnosis of  chronic dementia, CVA, hypernatremia, hyponatremia, UTI and drug overdose    ED Course:              Provider Notes (Medical Decision Making):   Patient is a 79-year-old female who presents to emergency department for evaluation of altered mental status. According to family, patient was noted to be sleeping and unarousable on the floor this morning. Patient does have a history of oxycodone use for her chronic back pain. Upon arrival to the emergency department, patient was arousable to painful stimuli. She had pinpoint pupils. 0.4 mg IV Narcan was administered with response. Laboratory abnormalities include a leukocytosis of 17,000 and a potassium of 2.9. Chest x-ray, urinalysis, CT abdomen without evidence of infection. CT head demonstrates chronic small vessel disease. Plan was to replete patient's potassium and recheck prior to discharge home, however at approximately 0100 on 9/6, patient became acutely confused and unable to state the year or the current president. Patient to be admitted for observation for further investigation. [facility]           Consultations:       Consultations: -  Hospitalist Consultant: Dr. Bushra Lopez: We have asked for emergent assistance with regard to this patient. We have discussed the patients HPI, ROS, PE and results this far.   They will come and evaluate the patient for admission. Procedures and Critical Care       Performed by: Daniel Keith DO  Procedures         Disposition     Disposition: Admitted to Observation Unit the case was discussed with the admitting physician Osiel        Diagnosis     Clinical Impression:   1. Altered mental status, unspecified altered mental status type    2. Hypokalemia        Attestations:    Daniel Keith DO    Please note that this dictation was completed with [employer], the computer voice recognition software. Quite often unanticipated grammatical, syntax, homophones, and other interpretive errors are inadvertently transcribed by the computer software. Please disregard these errors. Please excuse any errors that have escaped final proofreading. Thank you.

## 2021-09-06 PROBLEM — R41.82 ALTERED MENTAL STATUS: Status: ACTIVE | Noted: 2021-09-06

## 2021-09-06 PROBLEM — E87.6 HYPOKALEMIA: Status: ACTIVE | Noted: 2021-09-06

## 2021-09-06 LAB
ANION GAP SERPL CALC-SCNC: 9 MMOL/L (ref 5–15)
APPEARANCE UR: ABNORMAL
ATRIAL RATE: 65 BPM
BACTERIA URNS QL MICRO: NEGATIVE /HPF
BILIRUB UR QL: NEGATIVE
BUN SERPL-MCNC: 23 MG/DL (ref 6–20)
BUN/CREAT SERPL: 25 (ref 12–20)
CA-I BLD-MCNC: 9 MG/DL (ref 8.5–10.1)
CALCULATED P AXIS, ECG09: 68 DEGREES
CALCULATED R AXIS, ECG10: -5 DEGREES
CALCULATED T AXIS, ECG11: -3 DEGREES
CHLORIDE SERPL-SCNC: 106 MMOL/L (ref 97–108)
CO2 SERPL-SCNC: 24 MMOL/L (ref 21–32)
COLOR UR: ABNORMAL
CREAT SERPL-MCNC: 0.93 MG/DL (ref 0.55–1.02)
DIAGNOSIS, 93000: NORMAL
ERYTHROCYTE [DISTWIDTH] IN BLOOD BY AUTOMATED COUNT: 12.8 % (ref 11.5–14.5)
GLUCOSE SERPL-MCNC: 91 MG/DL (ref 65–100)
GLUCOSE UR STRIP.AUTO-MCNC: NEGATIVE MG/DL
HCT VFR BLD AUTO: 33 % (ref 35–47)
HGB BLD-MCNC: 11.3 G/DL (ref 11.5–16)
HGB UR QL STRIP: NEGATIVE
KETONES UR QL STRIP.AUTO: 20 MG/DL
LACTATE SERPL-SCNC: 1 MMOL/L (ref 0.4–2)
LEUKOCYTE ESTERASE UR QL STRIP.AUTO: NEGATIVE
MAGNESIUM SERPL-MCNC: 2.1 MG/DL (ref 1.6–2.4)
MCH RBC QN AUTO: 31.6 PG (ref 26–34)
MCHC RBC AUTO-ENTMCNC: 34.2 G/DL (ref 30–36.5)
MCV RBC AUTO: 92.2 FL (ref 80–99)
MUCOUS THREADS URNS QL MICRO: ABNORMAL /LPF
NITRITE UR QL STRIP.AUTO: NEGATIVE
NRBC # BLD: 0 K/UL (ref 0–0.01)
NRBC BLD-RTO: 0 PER 100 WBC
P-R INTERVAL, ECG05: 198 MS
PH UR STRIP: 5 [PH] (ref 5–8)
PLATELET # BLD AUTO: 269 K/UL (ref 150–400)
PMV BLD AUTO: 11.4 FL (ref 8.9–12.9)
POTASSIUM SERPL-SCNC: 3.2 MMOL/L (ref 3.5–5.1)
PROT UR STRIP-MCNC: NEGATIVE MG/DL
Q-T INTERVAL, ECG07: 412 MS
QRS DURATION, ECG06: 108 MS
QTC CALCULATION (BEZET), ECG08: 428 MS
RBC # BLD AUTO: 3.58 M/UL (ref 3.8–5.2)
RBC #/AREA URNS HPF: ABNORMAL /HPF (ref 0–5)
SODIUM SERPL-SCNC: 139 MMOL/L (ref 136–145)
SP GR UR REFRACTOMETRY: 1.01 (ref 1–1.03)
T4 FREE SERPL-MCNC: 1.3 NG/DL (ref 0.8–1.5)
UROBILINOGEN UR QL STRIP.AUTO: 0.1 EU/DL (ref 0.1–1)
VENTRICULAR RATE, ECG03: 65 BPM
WBC # BLD AUTO: 12.8 K/UL (ref 3.6–11)
WBC URNS QL MICRO: ABNORMAL /HPF (ref 0–4)

## 2021-09-06 PROCEDURE — 81001 URINALYSIS AUTO W/SCOPE: CPT

## 2021-09-06 PROCEDURE — 74011250637 HC RX REV CODE- 250/637: Performed by: INTERNAL MEDICINE

## 2021-09-06 PROCEDURE — 74011250637 HC RX REV CODE- 250/637: Performed by: EMERGENCY MEDICINE

## 2021-09-06 PROCEDURE — 65270000029 HC RM PRIVATE

## 2021-09-06 PROCEDURE — 80048 BASIC METABOLIC PNL TOTAL CA: CPT

## 2021-09-06 PROCEDURE — 87506 IADNA-DNA/RNA PROBE TQ 6-11: CPT

## 2021-09-06 PROCEDURE — 74011250636 HC RX REV CODE- 250/636: Performed by: INTERNAL MEDICINE

## 2021-09-06 PROCEDURE — 74011000250 HC RX REV CODE- 250: Performed by: INTERNAL MEDICINE

## 2021-09-06 PROCEDURE — 83735 ASSAY OF MAGNESIUM: CPT

## 2021-09-06 PROCEDURE — 84439 ASSAY OF FREE THYROXINE: CPT

## 2021-09-06 PROCEDURE — 36415 COLL VENOUS BLD VENIPUNCTURE: CPT

## 2021-09-06 PROCEDURE — 89055 LEUKOCYTE ASSESSMENT FECAL: CPT

## 2021-09-06 PROCEDURE — 83605 ASSAY OF LACTIC ACID: CPT

## 2021-09-06 PROCEDURE — 87086 URINE CULTURE/COLONY COUNT: CPT

## 2021-09-06 PROCEDURE — 87493 C DIFF AMPLIFIED PROBE: CPT

## 2021-09-06 PROCEDURE — 85027 COMPLETE CBC AUTOMATED: CPT

## 2021-09-06 RX ORDER — SODIUM CHLORIDE 0.9 % (FLUSH) 0.9 %
5-40 SYRINGE (ML) INJECTION EVERY 8 HOURS
Status: DISCONTINUED | OUTPATIENT
Start: 2021-09-06 | End: 2021-09-08 | Stop reason: HOSPADM

## 2021-09-06 RX ORDER — LISINOPRIL 10 MG/1
40 TABLET ORAL DAILY
Status: DISCONTINUED | OUTPATIENT
Start: 2021-09-06 | End: 2021-09-08 | Stop reason: HOSPADM

## 2021-09-06 RX ORDER — ONDANSETRON 2 MG/ML
4 INJECTION INTRAMUSCULAR; INTRAVENOUS
Status: DISCONTINUED | OUTPATIENT
Start: 2021-09-06 | End: 2021-09-08 | Stop reason: HOSPADM

## 2021-09-06 RX ORDER — ENOXAPARIN SODIUM 100 MG/ML
30 INJECTION SUBCUTANEOUS DAILY
Status: DISCONTINUED | OUTPATIENT
Start: 2021-09-06 | End: 2021-09-08 | Stop reason: HOSPADM

## 2021-09-06 RX ORDER — SODIUM CHLORIDE 0.9 % (FLUSH) 0.9 %
5-40 SYRINGE (ML) INJECTION AS NEEDED
Status: DISCONTINUED | OUTPATIENT
Start: 2021-09-06 | End: 2021-09-08 | Stop reason: HOSPADM

## 2021-09-06 RX ORDER — FAMOTIDINE 20 MG/1
20 TABLET, FILM COATED ORAL DAILY
Status: DISCONTINUED | OUTPATIENT
Start: 2021-09-06 | End: 2021-09-08 | Stop reason: HOSPADM

## 2021-09-06 RX ORDER — ACETAMINOPHEN 650 MG/1
650 SUPPOSITORY RECTAL
Status: DISCONTINUED | OUTPATIENT
Start: 2021-09-06 | End: 2021-09-08 | Stop reason: HOSPADM

## 2021-09-06 RX ORDER — METRONIDAZOLE 500 MG/100ML
500 INJECTION, SOLUTION INTRAVENOUS EVERY 8 HOURS
Status: DISCONTINUED | OUTPATIENT
Start: 2021-09-06 | End: 2021-09-08 | Stop reason: HOSPADM

## 2021-09-06 RX ORDER — GABAPENTIN 300 MG/1
300 CAPSULE ORAL 3 TIMES DAILY
Status: DISCONTINUED | OUTPATIENT
Start: 2021-09-06 | End: 2021-09-08 | Stop reason: HOSPADM

## 2021-09-06 RX ORDER — FAMOTIDINE 20 MG/1
20 TABLET, FILM COATED ORAL 2 TIMES DAILY
Status: DISCONTINUED | OUTPATIENT
Start: 2021-09-06 | End: 2021-09-06

## 2021-09-06 RX ORDER — OXYCODONE HYDROCHLORIDE 10 MG/1
10 TABLET ORAL
Status: COMPLETED | OUTPATIENT
Start: 2021-09-06 | End: 2021-09-06

## 2021-09-06 RX ORDER — ACETAMINOPHEN 325 MG/1
650 TABLET ORAL
Status: DISCONTINUED | OUTPATIENT
Start: 2021-09-06 | End: 2021-09-08 | Stop reason: HOSPADM

## 2021-09-06 RX ORDER — METOPROLOL SUCCINATE 50 MG/1
50 TABLET, EXTENDED RELEASE ORAL DAILY
Status: DISCONTINUED | OUTPATIENT
Start: 2021-09-06 | End: 2021-09-08 | Stop reason: HOSPADM

## 2021-09-06 RX ORDER — SODIUM CHLORIDE 9 MG/ML
100 INJECTION, SOLUTION INTRAVENOUS CONTINUOUS
Status: DISCONTINUED | OUTPATIENT
Start: 2021-09-06 | End: 2021-09-08 | Stop reason: HOSPADM

## 2021-09-06 RX ORDER — ONDANSETRON 4 MG/1
4 TABLET, ORALLY DISINTEGRATING ORAL
Status: DISCONTINUED | OUTPATIENT
Start: 2021-09-06 | End: 2021-09-08 | Stop reason: HOSPADM

## 2021-09-06 RX ORDER — NORTRIPTYLINE HYDROCHLORIDE 25 MG/1
25 CAPSULE ORAL
Status: DISCONTINUED | OUTPATIENT
Start: 2021-09-06 | End: 2021-09-08 | Stop reason: HOSPADM

## 2021-09-06 RX ORDER — POLYETHYLENE GLYCOL 3350 17 G/17G
17 POWDER, FOR SOLUTION ORAL DAILY PRN
Status: DISCONTINUED | OUTPATIENT
Start: 2021-09-06 | End: 2021-09-08 | Stop reason: HOSPADM

## 2021-09-06 RX ORDER — ATORVASTATIN CALCIUM 10 MG/1
10 TABLET, FILM COATED ORAL
Status: DISCONTINUED | OUTPATIENT
Start: 2021-09-06 | End: 2021-09-08 | Stop reason: HOSPADM

## 2021-09-06 RX ADMIN — LISINOPRIL 40 MG: 10 TABLET ORAL at 11:34

## 2021-09-06 RX ADMIN — FAMOTIDINE 20 MG: 20 TABLET ORAL at 11:34

## 2021-09-06 RX ADMIN — Medication 10 ML: at 21:57

## 2021-09-06 RX ADMIN — CEFTRIAXONE 1 G: 1 INJECTION, POWDER, FOR SOLUTION INTRAMUSCULAR; INTRAVENOUS at 05:04

## 2021-09-06 RX ADMIN — GABAPENTIN 300 MG: 300 CAPSULE ORAL at 17:10

## 2021-09-06 RX ADMIN — SODIUM CHLORIDE 100 ML/HR: 9 INJECTION, SOLUTION INTRAVENOUS at 05:01

## 2021-09-06 RX ADMIN — METOPROLOL SUCCINATE 50 MG: 50 TABLET, EXTENDED RELEASE ORAL at 11:34

## 2021-09-06 RX ADMIN — METRONIDAZOLE 500 MG: 500 INJECTION, SOLUTION INTRAVENOUS at 11:34

## 2021-09-06 RX ADMIN — Medication 10 ML: at 05:06

## 2021-09-06 RX ADMIN — METRONIDAZOLE 500 MG: 500 INJECTION, SOLUTION INTRAVENOUS at 05:05

## 2021-09-06 RX ADMIN — NORTRIPTYLINE HYDROCHLORIDE 25 MG: 25 CAPSULE ORAL at 21:55

## 2021-09-06 RX ADMIN — ATORVASTATIN CALCIUM 10 MG: 10 TABLET, FILM COATED ORAL at 21:55

## 2021-09-06 RX ADMIN — GABAPENTIN 300 MG: 300 CAPSULE ORAL at 11:34

## 2021-09-06 RX ADMIN — ENOXAPARIN SODIUM 30 MG: 30 INJECTION SUBCUTANEOUS at 11:34

## 2021-09-06 RX ADMIN — GABAPENTIN 300 MG: 300 CAPSULE ORAL at 21:55

## 2021-09-06 RX ADMIN — OXYCODONE HYDROCHLORIDE 10 MG: 10 TABLET ORAL at 01:14

## 2021-09-06 RX ADMIN — Medication 10 ML: at 17:23

## 2021-09-06 NOTE — H&P
GENERAL GENERIC H&P/CONSULT    Subjective:  68-year-old female with past medical history of hypertension, family called EMS for lethargy. Patient was last well yesterday morning. Patient takes oxycodone, has received Narcan by EMS and ER with unclear response. Through the day she has regained her wakefulness however remains confused which is unlike her self. She is accompanied by family who is reported diarrhea over the past couple of days. Patient is quite confused, not providing reliable history. In the ER afebrile, has leukocytosis, elevated procalcitonin without identified source of infectious process. Serum potassium is 2.6, creatinine 2.46 with unknown baseline. Patient is referred for admission for further evaluation and management    Past Medical History:   Diagnosis Date    Back pain     Herniated disc, cervical     Hyperlipemia     Hypertension     Osteoarthritis     Scoliosis     Shingles     Shoulder pain     Spinal stenosis     Torn meniscus     Vaginal prolapse     Vertigo       Past Surgical History:   Procedure Laterality Date    HX HYSTERECTOMY        Prior to Admission medications    Medication Sig Start Date End Date Taking? Authorizing Provider   gabapentin (NEURONTIN) 300 mg capsule Take 300 mg by mouth three (3) times daily. Provider, Historical   lisinopriL (PRINIVIL, ZESTRIL) 40 mg tablet  1/12/21   Provider, Historical   simvastatin (ZOCOR) 20 mg tablet Take 20 mg by mouth At bedtime. Provider, Historical   diclofenac (VOLTAREN) 1 % gel by TransDERmal route. Provider, Historical   hydroCHLOROthiazide (HYDRODIURIL) 12.5 mg tablet  1/2/21   Provider, Historical   lidocaine (LIDODERM) 5 %  3/12/21   Provider, Historical   nortriptyline (PAMELOR) 25 mg capsule Take 25 mg by mouth At bedtime. Provider, Historical   calcium carbonate (CALTREX) 600 mg calcium (1,500 mg) tablet Take 600 mg by mouth two (2) times a day.     Provider, Historical   oxyCODONE IR (ROXICODONE) 10 mg tab immediate release tablet Take 10 mg by mouth every six (6) hours as needed for Pain. Provider, Historical   metoprolol succinate (TOPROL-XL) 50 mg XL tablet  3/20/21   Provider, Historical   estradioL (ESTRACE) 0.5 mg tablet Take 0.5 mg by mouth daily. Provider, Historical   famotidine (PEPCID) 20 mg tablet Take 20 mg by mouth two (2) times a day. Provider, Historical     Allergies   Allergen Reactions    Ciprofloxacin Rash    Penicillins Rash      Social History     Tobacco Use    Smoking status: Never Smoker    Smokeless tobacco: Never Used   Substance Use Topics    Alcohol use: Not on file      Family History   Problem Relation Age of Onset    Cancer Mother     Heart Disease Brother       Review of Systems   Unable to perform ROS: Mental status change       Objective:    09/05 1901 - 09/06 0700  In: 1300 [I.V.:1300]  Out: -   No intake/output data recorded. Patient Vitals for the past 8 hrs:   BP Temp Pulse Resp SpO2 Height Weight   09/06/21 0353 135/61 100.2 °F (37.9 °C) 94 20 95 % 5' 5\" (1.651 m) 59.5 kg (131 lb 2.8 oz)   09/05/21 2346 (!) 154/65  77 17 100 %     09/05/21 2214 (!) 127/59  70 18 98 %     09/05/21 2126 (!) 104/58  68 16 97 %       Physical Exam  Constitutional:       General: She is not in acute distress. Appearance: Normal appearance. Comments: Disoriented   HENT:      Head: Normocephalic and atraumatic. Eyes:      Extraocular Movements: Extraocular movements intact. Conjunctiva/sclera: Conjunctivae normal.      Pupils: Pupils are equal, round, and reactive to light. Cardiovascular:      Rate and Rhythm: Normal rate. Pulses: Normal pulses. Heart sounds: Normal heart sounds. No murmur heard. No friction rub. No gallop. Pulmonary:      Effort: Pulmonary effort is normal.      Breath sounds: Normal breath sounds. Abdominal:      General: Bowel sounds are normal.      Palpations: Abdomen is soft.    Musculoskeletal: General: Normal range of motion. Cervical back: Neck supple. Skin:     General: Skin is warm and dry. Neurological:      General: No focal deficit present. Mental Status: She is alert. She is disoriented. Cranial Nerves: No cranial nerve deficit. Motor: No weakness. Labs:    Recent Results (from the past 24 hour(s))   CBC WITH AUTOMATED DIFF    Collection Time: 09/05/21  3:35 PM   Result Value Ref Range    WBC 17.2 (H) 3.6 - 11.0 K/uL    RBC 3.52 (L) 3.80 - 5.20 M/uL    HGB 11.3 (L) 11.5 - 16.0 g/dL    HCT 32.1 (L) 35.0 - 47.0 %    MCV 91.2 80.0 - 99.0 FL    MCH 32.1 26.0 - 34.0 PG    MCHC 35.2 30.0 - 36.5 g/dL    RDW 13.0 11.5 - 14.5 %    PLATELET 903 868 - 743 K/uL    MPV 10.5 8.9 - 12.9 FL    NRBC 0.0 0.0  WBC    ABSOLUTE NRBC 0.00 0.00 - 0.01 K/uL    NEUTROPHILS 82 (H) 32 - 75 %    LYMPHOCYTES 8 (L) 12 - 49 %    MONOCYTES 8 5 - 13 %    EOSINOPHILS 1 0 - 7 %    BASOPHILS 0 0 - 1 %    IMMATURE GRANULOCYTES 1 (H) 0 - 0.5 %    ABS. NEUTROPHILS 14.2 (H) 1.8 - 8.0 K/UL    ABS. LYMPHOCYTES 1.3 0.8 - 3.5 K/UL    ABS. MONOCYTES 1.4 (H) 0.0 - 1.0 K/UL    ABS. EOSINOPHILS 0.2 0.0 - 0.4 K/UL    ABS. BASOPHILS 0.0 0.0 - 0.1 K/UL    ABS. IMM. GRANS. 0.1 (H) 0.00 - 0.04 K/UL    DF AUTOMATED     METABOLIC PANEL, COMPREHENSIVE    Collection Time: 09/05/21  3:35 PM   Result Value Ref Range    Sodium 133 (L) 136 - 145 mmol/L    Potassium 2.6 (LL) 3.5 - 5.1 mmol/L    Chloride 99 97 - 108 mmol/L    CO2 23 21 - 32 mmol/L    Anion gap 11 5 - 15 mmol/L    Glucose 162 (H) 65 - 100 mg/dL    BUN 41 (H) 6 - 20 mg/dL    Creatinine 2.46 (H) 0.55 - 1.02 mg/dL    BUN/Creatinine ratio 17 12 - 20      GFR est AA 23 (L) >60 ml/min/1.73m2    GFR est non-AA 19 (L) >60 ml/min/1.73m2    Calcium 9.1 8.5 - 10.1 mg/dL    Bilirubin, total 1.0 0.2 - 1.0 mg/dL    AST (SGOT) 13 (L) 15 - 37 U/L    ALT (SGPT) 19 12 - 78 U/L    Alk.  phosphatase 74 45 - 117 U/L    Protein, total 7.0 6.4 - 8.2 g/dL    Albumin 3.1 (L) 3.5 - 5.0 g/dL    Globulin 3.9 2.0 - 4.0 g/dL    A-G Ratio 0.8 (L) 1.1 - 2.2     TROPONIN I    Collection Time: 09/05/21  3:35 PM   Result Value Ref Range    Troponin-I, Qt. <0.05 <0.05 ng/mL   BNP    Collection Time: 09/05/21  3:35 PM   Result Value Ref Range    NT pro- (H) <125 pg/mL   MAGNESIUM    Collection Time: 09/05/21  3:35 PM   Result Value Ref Range    Magnesium 2.3 1.6 - 2.4 mg/dL   TSH 3RD GENERATION    Collection Time: 09/05/21  3:35 PM   Result Value Ref Range    TSH 0.31 (L) 0.36 - 3.74 uIU/mL   PROCALCITONIN    Collection Time: 09/05/21  3:35 PM   Result Value Ref Range    Procalcitonin 1.28 (H) 0 ng/mL   LACTIC ACID    Collection Time: 09/05/21  3:35 PM   Result Value Ref Range    Lactic acid 1.2 0.4 - 2.0 mmol/L   BLOOD GAS, ARTERIAL    Collection Time: 09/05/21  3:35 PM   Result Value Ref Range    pH 7.48 (H) 7.35 - 7.45      PCO2 29 (L) 35 - 45 mmHg    PO2 148 (H) 75 - 100 mmHg    O2 SAT >100 >95 %    BICARBONATE 22 22 - 26 mmol/L    BASE DEFICIT 1.1 0 - 2 mmol/L    O2 METHOD Room air      FIO2 21.0 %    SITE Right Radial      HAROON'S TEST PASS     URINALYSIS W/ REFLEX CULTURE    Collection Time: 09/05/21  4:00 PM    Specimen: Urine   Result Value Ref Range    Color Faith      Appearance Turbid (A) Clear      Specific gravity 1.023 1.003 - 1.030      pH (UA) 5.0 5.0 - 8.0      Protein 100 (A) Negative mg/dL    Glucose Negative Negative mg/dL    Ketone Negative Negative mg/dL    Bilirubin Negative Negative      Blood Negative Negative      Urobilinogen 0.1 0.1 - 1.0 EU/dL    Nitrites Negative Negative      Leukocyte Esterase Negative Negative      WBC 5-10 0 - 4 /hpf    RBC 0-5 0 - 5 /hpf    Bacteria Negative Negative /hpf    UA:UC IF INDICATED Culture not indicated by UA result Culture not indicated by UA result      Mucus Trace (A) Negative /lpf    Hyaline cast 5-10 0 - 5 /lpf   DRUG SCREEN, URINE    Collection Time: 09/05/21  4:00 PM   Result Value Ref Range    AMPHETAMINES Negative Negative      BARBITURATES Negative Negative      BENZODIAZEPINES Negative Negative      COCAINE Negative Negative      METHADONE Negative Negative      OPIATES Positive (A) Negative      PCP(PHENCYCLIDINE) Negative Negative      THC (TH-CANNABINOL) Negative Negative      Drug screen comment        This test is a screen for drugs of abuse in a medical setting only (i.e., they are unconfirmed results and as such must not be used for non-medical purposes, e.g.,employment testing, legal testing). Due to its inherent nature, false positive (FP) and false negative (FN) results may be obtained. Therefore, if necessary for medical care, recommend confirmation of positive findings by GC/MS. Assessment:  Active Problems:    Hypokalemia (9/6/2021)      Altered mental status (9/6/2021)      Altered mental status  -Initially lethargic letter confused  -Initially felt to be related to narcotic use, suboptimal response to Narcan  -Currently alert, oriented only to self, she is quite confused  -Suspect infectious processes underlying precipitating factor  -Nonfocal physical exam.  CT to static intracranial process, prior lacunar infarct and microvascular disease seen  -Supportive care    Diarrhea  -Obtain enteric culture, C. difficile assay, stool WBC  -IV fluid  -T-max is 100.2 °F, elevated procalcitonin  -Obtain blood culture  -Empiric antibiotics. Ceftriaxone, Flagyl    Hypokalemia  -Hold HCTZ  -Has received IV and p.o. KCl  -Follow-up BMP    PRAVEEN versus CKD stage III  -Creatinine of 2.46, unknown baseline  -Monitor with BMP    Hypertension  -Metoprolol.   Lisinopril on hold    DVT prophylaxis: Lovenox    Full code    Signed:  Isiah Newton MD 9/6/2021

## 2021-09-06 NOTE — ED NOTES
TRANSFER - OUT REPORT:    Verbal report given to Diane Richardson on [facility]  being transferred to [facility] for routine progression of care       Report consisted of patients Situation, Background, Assessment and   Recommendations(SBAR). Information from the following report(s) SBAR, ED Summary and MAR was reviewed with the receiving nurse. Lines:   Peripheral IV 09/05/21 Left;Posterior Forearm (Active)        Opportunity for questions and clarification was provided.       Patient transported with:   Monitor  Registered Nurse

## 2021-09-06 NOTE — PROGRESS NOTES
Reason for Admission:  Hypokalemia, Altered Mental Status                      RUR Score: 14%                  Plan for utilizing home health:  Unknown at this time        PCP: First and Last name:  Elaine Soriano MD     Name of Practice:    Are you a current patient: Yes/No: yes   Approximate date of last visit:    Can you participate in a virtual visit with your PCP: yes                    Current Advanced Directive/Advance Care Plan: Full Code      Healthcare Decision Maker:   Daughter-in-law: Ozzy Foster [phone]  Son: Stan Mclain  Click here to [address] including selection of the Healthcare Decision Maker Relationship (ie \"Primary\")                  Transition of Care Plan: Due to patient's confusion CM reached out the [facility] listed, Ozzy Foster, at [phone] to complete assessment. Her  is Stan Mclain who is the patient's legal NOK (son). She reported that she and patient's son and their children live with the patient in her own home. She reported the patient is very independent at baseline and this confusion is all new. She reported the patient drives her self to and from her own doctors appointments and does not use any DME or assistive devices. They anticipate the patient will return home self-care once she is medically stable. CM will continue to follow.

## 2021-09-06 NOTE — PROGRESS NOTES
Hospitalist Progress Note    Subjective:   Daily Progress Note: 9/6/2021 8:56 AM    Remains confused although complains of abdominal pain and urinary retention    Current Facility-Administered Medications   Medication Dose Route Frequency    gabapentin (NEURONTIN) capsule 300 mg  300 mg Oral TID    lisinopriL (PRINIVIL, ZESTRIL) tablet 40 mg  40 mg Oral DAILY    atorvastatin (LIPITOR) tablet 10 mg  10 mg Oral QHS    nortriptyline (PAMELOR) capsule 25 mg  25 mg Oral QHS    metoprolol succinate (TOPROL-XL) XL tablet 50 mg  50 mg Oral DAILY    sodium chloride (NS) flush 5-40 mL  5-40 mL IntraVENous Q8H    sodium chloride (NS) flush 5-40 mL  5-40 mL IntraVENous PRN    acetaminophen (TYLENOL) tablet 650 mg  650 mg Oral Q6H PRN    Or    acetaminophen (TYLENOL) suppository 650 mg  650 mg Rectal Q6H PRN    polyethylene glycol (MIRALAX) packet 17 g  17 g Oral DAILY PRN    ondansetron (ZOFRAN ODT) tablet 4 mg  4 mg Oral Q8H PRN    Or    ondansetron (ZOFRAN) injection 4 mg  4 mg IntraVENous Q6H PRN    enoxaparin (LOVENOX) injection 30 mg  30 mg SubCUTAneous DAILY    cefTRIAXone (ROCEPHIN) 1 g in sterile water (preservative free) 10 mL IV syringe  1 g IntraVENous Q24H    metroNIDAZOLE (FLAGYL) IVPB premix 500 mg  500 mg IntraVENous Q8H    0.9% sodium chloride infusion  100 mL/hr IntraVENous CONTINUOUS    famotidine (PEPCID) tablet 20 mg  20 mg Oral DAILY        Review of Systems  Review of Systems   Constitutional: Positive for malaise/fatigue. Negative for fever. HENT: Negative. Eyes: Negative. Respiratory: Negative for cough and shortness of breath. Cardiovascular: Negative for chest pain and leg swelling. Gastrointestinal: Positive for abdominal pain and diarrhea. Negative for nausea and vomiting. Genitourinary:        Urinary retention   Musculoskeletal: Negative. Skin: Negative. Neurological: Negative.     Psychiatric/Behavioral:        Confused            Objective:     Visit Vitals  BP (!) 142/63   Pulse 95   Temp 99.7 °F (37.6 °C)   Resp 16   Ht 5' 5\" (1.651 m)   Wt 59.5 kg (131 lb 2.8 oz)   SpO2 98%   BMI 21.83 kg/m²      O2 Device: None (Room air)    Temp (24hrs), Av.1 °F (37.3 °C), Min:97.3 °F (36.3 °C), Max:100.2 °F (37.9 °C)      No intake/output data recorded.  1901 -  0700  In: 1300 [I.V.:1300]  Out: -     Recent Results (from the past 24 hour(s))   CBC WITH AUTOMATED DIFF    Collection Time: 21  3:35 PM   Result Value Ref Range    WBC 17.2 (H) 3.6 - 11.0 K/uL    RBC 3.52 (L) 3.80 - 5.20 M/uL    HGB 11.3 (L) 11.5 - 16.0 g/dL    HCT 32.1 (L) 35.0 - 47.0 %    MCV 91.2 80.0 - 99.0 FL    MCH 32.1 26.0 - 34.0 PG    MCHC 35.2 30.0 - 36.5 g/dL    RDW 13.0 11.5 - 14.5 %    PLATELET 624 773 - 834 K/uL    MPV 10.5 8.9 - 12.9 FL    NRBC 0.0 0.0  WBC    ABSOLUTE NRBC 0.00 0.00 - 0.01 K/uL    NEUTROPHILS 82 (H) 32 - 75 %    LYMPHOCYTES 8 (L) 12 - 49 %    MONOCYTES 8 5 - 13 %    EOSINOPHILS 1 0 - 7 %    BASOPHILS 0 0 - 1 %    IMMATURE GRANULOCYTES 1 (H) 0 - 0.5 %    ABS. NEUTROPHILS 14.2 (H) 1.8 - 8.0 K/UL    ABS. LYMPHOCYTES 1.3 0.8 - 3.5 K/UL    ABS. MONOCYTES 1.4 (H) 0.0 - 1.0 K/UL    ABS. EOSINOPHILS 0.2 0.0 - 0.4 K/UL    ABS. BASOPHILS 0.0 0.0 - 0.1 K/UL    ABS. IMM. GRANS. 0.1 (H) 0.00 - 0.04 K/UL    DF AUTOMATED     METABOLIC PANEL, COMPREHENSIVE    Collection Time: 21  3:35 PM   Result Value Ref Range    Sodium 133 (L) 136 - 145 mmol/L    Potassium 2.6 (LL) 3.5 - 5.1 mmol/L    Chloride 99 97 - 108 mmol/L    CO2 23 21 - 32 mmol/L    Anion gap 11 5 - 15 mmol/L    Glucose 162 (H) 65 - 100 mg/dL    BUN 41 (H) 6 - 20 mg/dL    Creatinine 2.46 (H) 0.55 - 1.02 mg/dL    BUN/Creatinine ratio 17 12 - 20      GFR est AA 23 (L) >60 ml/min/1.73m2    GFR est non-AA 19 (L) >60 ml/min/1.73m2    Calcium 9.1 8.5 - 10.1 mg/dL    Bilirubin, total 1.0 0.2 - 1.0 mg/dL    AST (SGOT) 13 (L) 15 - 37 U/L    ALT (SGPT) 19 12 - 78 U/L    Alk.  phosphatase 74 45 - 117 U/L Protein, total 7.0 6.4 - 8.2 g/dL    Albumin 3.1 (L) 3.5 - 5.0 g/dL    Globulin 3.9 2.0 - 4.0 g/dL    A-G Ratio 0.8 (L) 1.1 - 2.2     TROPONIN I    Collection Time: 09/05/21  3:35 PM   Result Value Ref Range    Troponin-I, Qt. <0.05 <0.05 ng/mL   BNP    Collection Time: 09/05/21  3:35 PM   Result Value Ref Range    NT pro- (H) <125 pg/mL   MAGNESIUM    Collection Time: 09/05/21  3:35 PM   Result Value Ref Range    Magnesium 2.3 1.6 - 2.4 mg/dL   TSH 3RD GENERATION    Collection Time: 09/05/21  3:35 PM   Result Value Ref Range    TSH 0.31 (L) 0.36 - 3.74 uIU/mL   PROCALCITONIN    Collection Time: 09/05/21  3:35 PM   Result Value Ref Range    Procalcitonin 1.28 (H) 0 ng/mL   LACTIC ACID    Collection Time: 09/05/21  3:35 PM   Result Value Ref Range    Lactic acid 1.2 0.4 - 2.0 mmol/L   BLOOD GAS, ARTERIAL    Collection Time: 09/05/21  3:35 PM   Result Value Ref Range    pH 7.48 (H) 7.35 - 7.45      PCO2 29 (L) 35 - 45 mmHg    PO2 148 (H) 75 - 100 mmHg    O2 SAT >100 >95 %    BICARBONATE 22 22 - 26 mmol/L    BASE DEFICIT 1.1 0 - 2 mmol/L    O2 METHOD Room air      FIO2 21.0 %    SITE Right Radial      HAROON'S TEST PASS     URINALYSIS W/ REFLEX CULTURE    Collection Time: 09/05/21  4:00 PM    Specimen: Urine   Result Value Ref Range    Color Faith      Appearance Turbid (A) Clear      Specific gravity 1.023 1.003 - 1.030      pH (UA) 5.0 5.0 - 8.0      Protein 100 (A) Negative mg/dL    Glucose Negative Negative mg/dL    Ketone Negative Negative mg/dL    Bilirubin Negative Negative      Blood Negative Negative      Urobilinogen 0.1 0.1 - 1.0 EU/dL    Nitrites Negative Negative      Leukocyte Esterase Negative Negative      WBC 5-10 0 - 4 /hpf    RBC 0-5 0 - 5 /hpf    Bacteria Negative Negative /hpf    UA:UC IF INDICATED Culture not indicated by UA result Culture not indicated by UA result      Mucus Trace (A) Negative /lpf    Hyaline cast 5-10 0 - 5 /lpf   DRUG SCREEN, URINE    Collection Time: 09/05/21  4:00 PM Result Value Ref Range    AMPHETAMINES Negative Negative      BARBITURATES Negative Negative      BENZODIAZEPINES Negative Negative      COCAINE Negative Negative      METHADONE Negative Negative      OPIATES Positive (A) Negative      PCP(PHENCYCLIDINE) Negative Negative      THC (TH-CANNABINOL) Negative Negative      Drug screen comment        This test is a screen for drugs of abuse in a medical setting only (i.e., they are unconfirmed results and as such must not be used for non-medical purposes, e.g.,employment testing, legal testing). Due to its inherent nature, false positive (FP) and false negative (FN) results may be obtained. Therefore, if necessary for medical care, recommend confirmation of positive findings by GC/MS.    METABOLIC PANEL, BASIC    Collection Time: 09/06/21  5:36 AM   Result Value Ref Range    Sodium 139 136 - 145 mmol/L    Potassium 3.2 (L) 3.5 - 5.1 mmol/L    Chloride 106 97 - 108 mmol/L    CO2 24 21 - 32 mmol/L    Anion gap 9 5 - 15 mmol/L    Glucose 91 65 - 100 mg/dL    BUN 23 (H) 6 - 20 mg/dL    Creatinine 0.93 0.55 - 1.02 mg/dL    BUN/Creatinine ratio 25 (H) 12 - 20      GFR est AA >60 >60 ml/min/1.73m2    GFR est non-AA 59 (L) >60 ml/min/1.73m2    Calcium 9.0 8.5 - 10.1 mg/dL   CBC W/O DIFF    Collection Time: 09/06/21  5:36 AM   Result Value Ref Range    WBC 12.8 (H) 3.6 - 11.0 K/uL    RBC 3.58 (L) 3.80 - 5.20 M/uL    HGB 11.3 (L) 11.5 - 16.0 g/dL    HCT 33.0 (L) 35.0 - 47.0 %    MCV 92.2 80.0 - 99.0 FL    MCH 31.6 26.0 - 34.0 PG    MCHC 34.2 30.0 - 36.5 g/dL    RDW 12.8 11.5 - 14.5 %    PLATELET 350 041 - 308 K/uL    MPV 11.4 8.9 - 12.9 FL    NRBC 0.0 0.0  WBC    ABSOLUTE NRBC 0.00 0.00 - 0.01 K/uL   MAGNESIUM    Collection Time: 09/06/21  5:36 AM   Result Value Ref Range    Magnesium 2.1 1.6 - 2.4 mg/dL        CT ABD PELV WO CONT   Final Result   No active findings      CT dose reduction was achieved through use of a standardized protocol tailored   for this examination and automatic exposure control for dose modulation. CT HEAD WO CONT   Final Result   1. There are smaller punctate foci of CSF density demonstrated along the   anterior limb of the right internal capsule and within the right anterior   periventricular/centrum semiovale white matter most compatible with tiny old   lacunar infarcts. 2.  There are milder microvascular changes with a slight interval progression   when compared to prior imaging. 3.  This examination is negative for acute intracranial pathology. XR CHEST SNGL V   Final Result   Findings/impression:      Slightly enlarged cardiac size. Clear lungs. No infiltrate. No overt edema. No   pneumothorax or effusion. Extensive mitral valve calcifications are present;   needs clinical correlation. No definite acute cardiopulmonary process noted. PHYSICAL EXAM:    Physical Exam  Vitals reviewed. Constitutional:       General: She is not in acute distress. Appearance: She is not ill-appearing. HENT:      Head: Normocephalic and atraumatic. Mouth/Throat:      Mouth: Mucous membranes are dry. Eyes:      Conjunctiva/sclera: Conjunctivae normal.   Cardiovascular:      Rate and Rhythm: Normal rate and regular rhythm. Heart sounds: Normal heart sounds. Pulmonary:      Effort: Pulmonary effort is normal.      Breath sounds: Normal breath sounds. Abdominal:      General: Bowel sounds are normal. There is distension. Tenderness: There is abdominal tenderness. Comments: Generalized tenderness 1+ all 4 quadrants   Musculoskeletal:         General: Normal range of motion. Cervical back: Normal range of motion and neck supple. Skin:     General: Skin is warm and dry. Neurological:      General: No focal deficit present. Mental Status: She is alert. Mental status is at baseline. She is disoriented.    Psychiatric:      Comments: Confusion          Data Review    Recent Results (from the past 24 hour(s))   CBC WITH AUTOMATED DIFF    Collection Time: 09/05/21  3:35 PM   Result Value Ref Range    WBC 17.2 (H) 3.6 - 11.0 K/uL    RBC 3.52 (L) 3.80 - 5.20 M/uL    HGB 11.3 (L) 11.5 - 16.0 g/dL    HCT 32.1 (L) 35.0 - 47.0 %    MCV 91.2 80.0 - 99.0 FL    MCH 32.1 26.0 - 34.0 PG    MCHC 35.2 30.0 - 36.5 g/dL    RDW 13.0 11.5 - 14.5 %    PLATELET 795 371 - 283 K/uL    MPV 10.5 8.9 - 12.9 FL    NRBC 0.0 0.0  WBC    ABSOLUTE NRBC 0.00 0.00 - 0.01 K/uL    NEUTROPHILS 82 (H) 32 - 75 %    LYMPHOCYTES 8 (L) 12 - 49 %    MONOCYTES 8 5 - 13 %    EOSINOPHILS 1 0 - 7 %    BASOPHILS 0 0 - 1 %    IMMATURE GRANULOCYTES 1 (H) 0 - 0.5 %    ABS. NEUTROPHILS 14.2 (H) 1.8 - 8.0 K/UL    ABS. LYMPHOCYTES 1.3 0.8 - 3.5 K/UL    ABS. MONOCYTES 1.4 (H) 0.0 - 1.0 K/UL    ABS. EOSINOPHILS 0.2 0.0 - 0.4 K/UL    ABS. BASOPHILS 0.0 0.0 - 0.1 K/UL    ABS. IMM. GRANS. 0.1 (H) 0.00 - 0.04 K/UL    DF AUTOMATED     METABOLIC PANEL, COMPREHENSIVE    Collection Time: 09/05/21  3:35 PM   Result Value Ref Range    Sodium 133 (L) 136 - 145 mmol/L    Potassium 2.6 (LL) 3.5 - 5.1 mmol/L    Chloride 99 97 - 108 mmol/L    CO2 23 21 - 32 mmol/L    Anion gap 11 5 - 15 mmol/L    Glucose 162 (H) 65 - 100 mg/dL    BUN 41 (H) 6 - 20 mg/dL    Creatinine 2.46 (H) 0.55 - 1.02 mg/dL    BUN/Creatinine ratio 17 12 - 20      GFR est AA 23 (L) >60 ml/min/1.73m2    GFR est non-AA 19 (L) >60 ml/min/1.73m2    Calcium 9.1 8.5 - 10.1 mg/dL    Bilirubin, total 1.0 0.2 - 1.0 mg/dL    AST (SGOT) 13 (L) 15 - 37 U/L    ALT (SGPT) 19 12 - 78 U/L    Alk.  phosphatase 74 45 - 117 U/L    Protein, total 7.0 6.4 - 8.2 g/dL    Albumin 3.1 (L) 3.5 - 5.0 g/dL    Globulin 3.9 2.0 - 4.0 g/dL    A-G Ratio 0.8 (L) 1.1 - 2.2     TROPONIN I    Collection Time: 09/05/21  3:35 PM   Result Value Ref Range    Troponin-I, Qt. <0.05 <0.05 ng/mL   BNP    Collection Time: 09/05/21  3:35 PM   Result Value Ref Range    NT pro- (H) <125 pg/mL   MAGNESIUM    Collection Time: 09/05/21  3:35 PM Result Value Ref Range    Magnesium 2.3 1.6 - 2.4 mg/dL   TSH 3RD GENERATION    Collection Time: 09/05/21  3:35 PM   Result Value Ref Range    TSH 0.31 (L) 0.36 - 3.74 uIU/mL   PROCALCITONIN    Collection Time: 09/05/21  3:35 PM   Result Value Ref Range    Procalcitonin 1.28 (H) 0 ng/mL   LACTIC ACID    Collection Time: 09/05/21  3:35 PM   Result Value Ref Range    Lactic acid 1.2 0.4 - 2.0 mmol/L   BLOOD GAS, ARTERIAL    Collection Time: 09/05/21  3:35 PM   Result Value Ref Range    pH 7.48 (H) 7.35 - 7.45      PCO2 29 (L) 35 - 45 mmHg    PO2 148 (H) 75 - 100 mmHg    O2 SAT >100 >95 %    BICARBONATE 22 22 - 26 mmol/L    BASE DEFICIT 1.1 0 - 2 mmol/L    O2 METHOD Room air      FIO2 21.0 %    SITE Right Radial      HAROON'S TEST PASS     URINALYSIS W/ REFLEX CULTURE    Collection Time: 09/05/21  4:00 PM    Specimen: Urine   Result Value Ref Range    Color Faith      Appearance Turbid (A) Clear      Specific gravity 1.023 1.003 - 1.030      pH (UA) 5.0 5.0 - 8.0      Protein 100 (A) Negative mg/dL    Glucose Negative Negative mg/dL    Ketone Negative Negative mg/dL    Bilirubin Negative Negative      Blood Negative Negative      Urobilinogen 0.1 0.1 - 1.0 EU/dL    Nitrites Negative Negative      Leukocyte Esterase Negative Negative      WBC 5-10 0 - 4 /hpf    RBC 0-5 0 - 5 /hpf    Bacteria Negative Negative /hpf    UA:UC IF INDICATED Culture not indicated by UA result Culture not indicated by UA result      Mucus Trace (A) Negative /lpf    Hyaline cast 5-10 0 - 5 /lpf   DRUG SCREEN, URINE    Collection Time: 09/05/21  4:00 PM   Result Value Ref Range    AMPHETAMINES Negative Negative      BARBITURATES Negative Negative      BENZODIAZEPINES Negative Negative      COCAINE Negative Negative      METHADONE Negative Negative      OPIATES Positive (A) Negative      PCP(PHENCYCLIDINE) Negative Negative      THC (TH-CANNABINOL) Negative Negative      Drug screen comment        This test is a screen for drugs of abuse in a medical setting only (i.e., they are unconfirmed results and as such must not be used for non-medical purposes, e.g.,employment testing, legal testing). Due to its inherent nature, false positive (FP) and false negative (FN) results may be obtained. Therefore, if necessary for medical care, recommend confirmation of positive findings by GC/MS. METABOLIC PANEL, BASIC    Collection Time: 09/06/21  5:36 AM   Result Value Ref Range    Sodium 139 136 - 145 mmol/L    Potassium 3.2 (L) 3.5 - 5.1 mmol/L    Chloride 106 97 - 108 mmol/L    CO2 24 21 - 32 mmol/L    Anion gap 9 5 - 15 mmol/L    Glucose 91 65 - 100 mg/dL    BUN 23 (H) 6 - 20 mg/dL    Creatinine 0.93 0.55 - 1.02 mg/dL    BUN/Creatinine ratio 25 (H) 12 - 20      GFR est AA >60 >60 ml/min/1.73m2    GFR est non-AA 59 (L) >60 ml/min/1.73m2    Calcium 9.0 8.5 - 10.1 mg/dL   CBC W/O DIFF    Collection Time: 09/06/21  5:36 AM   Result Value Ref Range    WBC 12.8 (H) 3.6 - 11.0 K/uL    RBC 3.58 (L) 3.80 - 5.20 M/uL    HGB 11.3 (L) 11.5 - 16.0 g/dL    HCT 33.0 (L) 35.0 - 47.0 %    MCV 92.2 80.0 - 99.0 FL    MCH 31.6 26.0 - 34.0 PG    MCHC 34.2 30.0 - 36.5 g/dL    RDW 12.8 11.5 - 14.5 %    PLATELET 035 801 - 342 K/uL    MPV 11.4 8.9 - 12.9 FL    NRBC 0.0 0.0  WBC    ABSOLUTE NRBC 0.00 0.00 - 0.01 K/uL   MAGNESIUM    Collection Time: 09/06/21  5:36 AM   Result Value Ref Range    Magnesium 2.1 1.6 - 2.4 mg/dL        Assessment/Plan:     Active Problems:    Hypokalemia (9/6/2021)      Altered mental status (9/6/2021)      Hospital course: This 43-year-old female admitted on 09/05/2021 secondary to metabolic encephalopathy, diarrhea and acute kidney injury. CT scan of the abdomen pelvis revealed no acute process. CT of the head revealed no acute intracranial process. Procalcitonin is elevated although white count is trending down appropriately. Creatinine improved from 2.46 at time of admission and trended down appropriately.   Blood culture pending and as well as enteric cultures and C. difficile. Chest x-ray was normal    Impression:    1. Acute metabolic encephalopathy  2. PRAVEEN  3. Infectious diarrhea  4. Urinary retention  5. Essential hypertension  6. Hypokalemia    Plan:    1. Acute metabolic encephalopathy  Appears to be resolving as kidney function is improving    2. PRAVEEN  Creatinine improving with IV hydration    3. Infectious diarrhea  Continue Rocephin  Continue Flagyl  CT scan abdomen pelvis revealed no acute process although further review shows possible colitis. Enteric cultures pending  C. difficile pending  Consider GI consultation    4. Urinary retention  Xie catheter to be placed    5. Essential hypertension  Continue metoprolol  Continue lisinopril    6. Hypokalemia  Replete as needed    CODE STATUS: Full code    DVT prophylaxis Lovenox  Ulcer prophylaxis: Pepcid    Discharge barriers: Resolution of patient's metabolic encephalopathy, resolution of diarrhea and completion of stool cultures. Discussed case with Mabel Correa, [phone]    Care Plan discussed with: Patient/Family    Total time spent with patient: >35 minutes.

## 2021-09-07 LAB
ALBUMIN SERPL-MCNC: 2.7 G/DL (ref 3.5–5)
ALBUMIN/GLOB SERPL: 0.7 {RATIO} (ref 1.1–2.2)
ALP SERPL-CCNC: 66 U/L (ref 45–117)
ALT SERPL-CCNC: 25 U/L (ref 12–78)
ANION GAP SERPL CALC-SCNC: 8 MMOL/L (ref 5–15)
AST SERPL W P-5'-P-CCNC: 36 U/L (ref 15–37)
BACTERIA SPEC CULT: NORMAL
BASOPHILS # BLD: 0 K/UL (ref 0–0.1)
BASOPHILS NFR BLD: 1 % (ref 0–1)
BILIRUB SERPL-MCNC: 0.4 MG/DL (ref 0.2–1)
BUN SERPL-MCNC: 10 MG/DL (ref 6–20)
BUN/CREAT SERPL: 17 (ref 12–20)
CA-I BLD-MCNC: 8.4 MG/DL (ref 8.5–10.1)
CHLORIDE SERPL-SCNC: 108 MMOL/L (ref 97–108)
CO2 SERPL-SCNC: 26 MMOL/L (ref 21–32)
CREAT SERPL-MCNC: 0.59 MG/DL (ref 0.55–1.02)
DIFFERENTIAL METHOD BLD: ABNORMAL
EOSINOPHIL # BLD: 0.3 K/UL (ref 0–0.4)
EOSINOPHIL NFR BLD: 3 % (ref 0–7)
ERYTHROCYTE [DISTWIDTH] IN BLOOD BY AUTOMATED COUNT: 12.6 % (ref 11.5–14.5)
GLOBULIN SER CALC-MCNC: 3.7 G/DL (ref 2–4)
GLUCOSE SERPL-MCNC: 99 MG/DL (ref 65–100)
HCT VFR BLD AUTO: 29.7 % (ref 35–47)
HGB BLD-MCNC: 10.6 G/DL (ref 11.5–16)
IMM GRANULOCYTES # BLD AUTO: 0 K/UL (ref 0–0.04)
IMM GRANULOCYTES NFR BLD AUTO: 1 % (ref 0–0.5)
LYMPHOCYTES # BLD: 2.2 K/UL (ref 0.8–3.5)
LYMPHOCYTES NFR BLD: 25 % (ref 12–49)
MCH RBC QN AUTO: 32.1 PG (ref 26–34)
MCHC RBC AUTO-ENTMCNC: 35.7 G/DL (ref 30–36.5)
MCV RBC AUTO: 90 FL (ref 80–99)
MONOCYTES # BLD: 0.9 K/UL (ref 0–1)
MONOCYTES NFR BLD: 10 % (ref 5–13)
NEUTS SEG # BLD: 5.3 K/UL (ref 1.8–8)
NEUTS SEG NFR BLD: 60 % (ref 32–75)
NRBC # BLD: 0 K/UL (ref 0–0.01)
NRBC BLD-RTO: 0 PER 100 WBC
PLATELET # BLD AUTO: 268 K/UL (ref 150–400)
PMV BLD AUTO: 10.7 FL (ref 8.9–12.9)
POTASSIUM SERPL-SCNC: 2.7 MMOL/L (ref 3.5–5.1)
PROT SERPL-MCNC: 6.4 G/DL (ref 6.4–8.2)
RBC # BLD AUTO: 3.3 M/UL (ref 3.8–5.2)
SODIUM SERPL-SCNC: 142 MMOL/L (ref 136–145)
SPECIAL REQUESTS,SREQ: NORMAL
WBC # BLD AUTO: 8.7 K/UL (ref 3.6–11)

## 2021-09-07 PROCEDURE — 65270000029 HC RM PRIVATE

## 2021-09-07 PROCEDURE — 85025 COMPLETE CBC W/AUTO DIFF WBC: CPT

## 2021-09-07 PROCEDURE — 74011250637 HC RX REV CODE- 250/637: Performed by: INTERNAL MEDICINE

## 2021-09-07 PROCEDURE — 74011250637 HC RX REV CODE- 250/637: Performed by: NURSE PRACTITIONER

## 2021-09-07 PROCEDURE — 74011000250 HC RX REV CODE- 250: Performed by: INTERNAL MEDICINE

## 2021-09-07 PROCEDURE — 80053 COMPREHEN METABOLIC PANEL: CPT

## 2021-09-07 PROCEDURE — 36415 COLL VENOUS BLD VENIPUNCTURE: CPT

## 2021-09-07 PROCEDURE — 74011250636 HC RX REV CODE- 250/636: Performed by: NURSE PRACTITIONER

## 2021-09-07 PROCEDURE — 74011250636 HC RX REV CODE- 250/636: Performed by: INTERNAL MEDICINE

## 2021-09-07 RX ORDER — POTASSIUM CHLORIDE 1.5 G/1.77G
40 POWDER, FOR SOLUTION ORAL 2 TIMES DAILY WITH MEALS
Status: COMPLETED | OUTPATIENT
Start: 2021-09-07 | End: 2021-09-07

## 2021-09-07 RX ORDER — POTASSIUM CHLORIDE 7.45 MG/ML
10 INJECTION INTRAVENOUS
Status: COMPLETED | OUTPATIENT
Start: 2021-09-07 | End: 2021-09-07

## 2021-09-07 RX ADMIN — GABAPENTIN 300 MG: 300 CAPSULE ORAL at 16:08

## 2021-09-07 RX ADMIN — Medication 10 ML: at 16:14

## 2021-09-07 RX ADMIN — CEFTRIAXONE 1 G: 1 INJECTION, POWDER, FOR SOLUTION INTRAMUSCULAR; INTRAVENOUS at 03:38

## 2021-09-07 RX ADMIN — ENOXAPARIN SODIUM 30 MG: 30 INJECTION SUBCUTANEOUS at 08:06

## 2021-09-07 RX ADMIN — FAMOTIDINE 20 MG: 20 TABLET ORAL at 08:06

## 2021-09-07 RX ADMIN — GABAPENTIN 300 MG: 300 CAPSULE ORAL at 21:00

## 2021-09-07 RX ADMIN — ACETAMINOPHEN 650 MG: 325 TABLET ORAL at 23:19

## 2021-09-07 RX ADMIN — METRONIDAZOLE 500 MG: 500 INJECTION, SOLUTION INTRAVENOUS at 19:58

## 2021-09-07 RX ADMIN — POTASSIUM CHLORIDE 10 MEQ: 7.46 INJECTION, SOLUTION INTRAVENOUS at 10:46

## 2021-09-07 RX ADMIN — NORTRIPTYLINE HYDROCHLORIDE 25 MG: 25 CAPSULE ORAL at 21:00

## 2021-09-07 RX ADMIN — POTASSIUM CHLORIDE 40 MEQ: 1.5 FOR SOLUTION ORAL at 16:08

## 2021-09-07 RX ADMIN — GABAPENTIN 300 MG: 300 CAPSULE ORAL at 08:06

## 2021-09-07 RX ADMIN — LISINOPRIL 40 MG: 10 TABLET ORAL at 08:06

## 2021-09-07 RX ADMIN — METRONIDAZOLE 500 MG: 500 INJECTION, SOLUTION INTRAVENOUS at 12:28

## 2021-09-07 RX ADMIN — POTASSIUM CHLORIDE 40 MEQ: 1.5 FOR SOLUTION ORAL at 09:27

## 2021-09-07 RX ADMIN — ATORVASTATIN CALCIUM 10 MG: 10 TABLET, FILM COATED ORAL at 21:00

## 2021-09-07 RX ADMIN — METOPROLOL SUCCINATE 50 MG: 50 TABLET, EXTENDED RELEASE ORAL at 08:06

## 2021-09-07 RX ADMIN — POTASSIUM CHLORIDE 10 MEQ: 7.46 INJECTION, SOLUTION INTRAVENOUS at 09:27

## 2021-09-07 RX ADMIN — Medication 10 ML: at 06:18

## 2021-09-07 RX ADMIN — METRONIDAZOLE 500 MG: 500 INJECTION, SOLUTION INTRAVENOUS at 03:35

## 2021-09-07 RX ADMIN — Medication 10 ML: at 21:01

## 2021-09-07 NOTE — PROGRESS NOTES
Hospitalist Progress Note         THEO Salmon, FNP-C    Daily Progress Note: 9/7/2021      Subjective:   Subjective   Patient seen on f/u alert and oriented lying in bed  No complaints voiced on examination  No acute distress noted on examination  Nurse reports no diarrhea reported to them    Review of Systems:   Review of Systems   Constitutional: Negative. HENT: Negative. Eyes: Negative. Respiratory: Negative. Cardiovascular: Negative. Gastrointestinal: Positive for diarrhea. Genitourinary: Negative. Musculoskeletal: Negative. Skin: Negative. Neurological: Negative. Endo/Heme/Allergies: Negative. Psychiatric/Behavioral: Negative. Objective:   Objective      Vitals:  Patient Vitals for the past 12 hrs:   Temp Pulse Resp BP SpO2   09/07/21 0807 98.6 °F (37 °C) 81 17 (!) 154/72 96 %   09/07/21 0400  75      09/06/21 2153 99.7 °F (37.6 °C) 83 18 139/72 98 %        Physical Exam:  Physical Exam  Vitals and nursing note reviewed. Constitutional:       Appearance: Normal appearance. HENT:      Head: Normocephalic. Nose: Nose normal.      Mouth/Throat:      Mouth: Mucous membranes are moist.   Eyes:      Conjunctiva/sclera: Conjunctivae normal.   Cardiovascular:      Rate and Rhythm: Normal rate and regular rhythm. Pulses: Normal pulses. Heart sounds: Normal heart sounds. Pulmonary:      Effort: Pulmonary effort is normal.      Breath sounds: Normal breath sounds. Abdominal:      General: Bowel sounds are normal.      Palpations: Abdomen is soft. Genitourinary:     Comments: Xie in situ  Musculoskeletal:         General: Normal range of motion. Cervical back: Normal range of motion. Skin:     General: Skin is warm and dry. Capillary Refill: Capillary refill takes less than 2 seconds. Neurological:      Mental Status: She is alert and oriented to person, place, and time.    Psychiatric:         Mood and Affect: Mood normal. Behavior: Behavior normal.          Lab Results:  Recent Results (from the past 24 hour(s))   URINALYSIS W/MICROSCOPIC    Collection Time: 09/06/21 10:24 AM   Result Value Ref Range    Color Yellow/Straw      Appearance Turbid (A) Clear      Specific gravity 1.012 1.003 - 1.030      pH (UA) 5.0 5.0 - 8.0      Protein Negative Negative mg/dL    Glucose Negative Negative mg/dL    Ketone 20 (A) Negative mg/dL    Bilirubin Negative Negative      Blood Negative Negative      Urobilinogen 0.1 0.1 - 1.0 EU/dL    Nitrites Negative Negative      Leukocyte Esterase Negative Negative      WBC 0-4 0 - 4 /hpf    RBC 5-10 0 - 5 /hpf    Bacteria Negative Negative /hpf    Mucus Trace /lpf   METABOLIC PANEL, COMPREHENSIVE    Collection Time: 09/07/21  7:07 AM   Result Value Ref Range    Sodium 142 136 - 145 mmol/L    Potassium 2.7 (LL) 3.5 - 5.1 mmol/L    Chloride 108 97 - 108 mmol/L    CO2 26 21 - 32 mmol/L    Anion gap 8 5 - 15 mmol/L    Glucose 99 65 - 100 mg/dL    BUN 10 6 - 20 mg/dL    Creatinine 0.59 0.55 - 1.02 mg/dL    BUN/Creatinine ratio 17 12 - 20      GFR est AA >60 >60 ml/min/1.73m2    GFR est non-AA >60 >60 ml/min/1.73m2    Calcium 8.4 (L) 8.5 - 10.1 mg/dL    Bilirubin, total 0.4 0.2 - 1.0 mg/dL    AST (SGOT) 36 15 - 37 U/L    ALT (SGPT) 25 12 - 78 U/L    Alk. phosphatase 66 45 - 117 U/L    Protein, total 6.4 6.4 - 8.2 g/dL    Albumin 2.7 (L) 3.5 - 5.0 g/dL    Globulin 3.7 2.0 - 4.0 g/dL    A-G Ratio 0.7 (L) 1.1 - 2.2            Diagnostic Images:  CT Results  (Last 48 hours)               09/05/21 1805  CT ABD PELV WO CONT Final result    Impression:  No active findings       CT dose reduction was achieved through use of a standardized protocol tailored   for this examination and automatic exposure control for dose modulation. Narrative:  Noncontrast study shows mild atelectasis in the lung bases       Small cyst in the liver tip.  Spleen, pancreas, and borderline distended   gallbladder, adrenals and kidneys are normal. No small bowel or vascular   abnormality, lymphadenopathy or free fluid       Uterus is out. No mass or free fluid. Normal bladder and distal colon. Proximal   colon shows fat replacement of the wall. . No adjacent fat edema. High density   stool throughout,: Mostly collapsed. Normal appendix. No mesenteric fat edema. Small fat-containing umbilical hernia       Advanced degenerative changes and severe scoliosis           09/05/21 1640  CT HEAD WO CONT Final result    Impression:  1. There are smaller punctate foci of CSF density demonstrated along the   anterior limb of the right internal capsule and within the right anterior   periventricular/centrum semiovale white matter most compatible with tiny old   lacunar infarcts. 2.  There are milder microvascular changes with a slight interval progression   when compared to prior imaging. 3.  This examination is negative for acute intracranial pathology. Narrative: The study is a noncontrasted CT examination of the head dated 9/5/2021. HISTORY: Altered mental status. TECHNIQUE: Thin section axial imaging was performed followed by sagittal and   coronal reconstructed imaging. Dose Reduction Technique was employed to reduce radiation exposure - This   includes reduction optimization techniques as appropriate to a performed exam   with automated exposure control adjustments of the mA and/or Kv according to   patient size, or use of iterative reconstruction technique. COMPARISON: CT evaluation of the head dated 6/5/2018. The ventricles are normal in size with a midline position. This examination is   negative for intracranial hemorrhage, mass effect or an extra axial collection. The gray-white matter junctions are preserved without cytotoxic or vasogenic   edema. There is a tiny fluid density collection along the anterior limb of the right   internal capsule (series 201 image number 17).  These findings are suspect for a   tiny old lacunar infarct versus a prominent perivascular space. There is a similar area within the right anterior periventricular white matter   (series 201 image number 20) most compatible with a small older lacunar infarct. There has been a mild interval progression in the microvascular changes. There   is milder microvascular disease. ORBITS: This examination is negative for acute orbital pathology. PARANASAL SINUSES: This examination is negative for acute sinus disease. The mastoid air cells and middle ear cavities image in a normal fashion. The   sella and the suprasellar regions are unremarkable. The craniocervical junction   images in a normal fashion. OTHER: None.                  Current Medications:    Current Facility-Administered Medications:     potassium chloride (KLOR-CON) packet for solution 40 mEq, 40 mEq, Oral, BID WITH MEALS, So Albert, NP    potassium chloride 10 mEq in 100 ml IVPB, 10 mEq, IntraVENous, Q1H, Terrall Albert, NP    gabapentin (NEURONTIN) capsule 300 mg, 300 mg, Oral, TID, Joshua Cartagena MD, 300 mg at 09/07/21 0806    lisinopriL (PRINIVIL, ZESTRIL) tablet 40 mg, 40 mg, Oral, DAILY, Joshua Cartagena MD, 40 mg at 09/07/21 0806    atorvastatin (LIPITOR) tablet 10 mg, 10 mg, Oral, QHS, Joshua Cartagena MD, 10 mg at 09/06/21 2155    nortriptyline (PAMELOR) capsule 25 mg, 25 mg, Oral, QHS, Joshua Cartagena MD, 25 mg at 09/06/21 2155    metoprolol succinate (TOPROL-XL) XL tablet 50 mg, 50 mg, Oral, DAILY, Joshua Cartagena MD, 50 mg at 09/07/21 0806    sodium chloride (NS) flush 5-40 mL, 5-40 mL, IntraVENous, Q8H, Joshua Cartagena MD, 10 mL at 09/07/21 0618    sodium chloride (NS) flush 5-40 mL, 5-40 mL, IntraVENous, PRN, Joshua Cartagena MD    acetaminophen (TYLENOL) tablet 650 mg, 650 mg, Oral, Q6H PRN **OR** acetaminophen (TYLENOL) suppository 650 mg, 650 mg, Rectal, Q6H PRN, Joshua Cartagena MD    polyethylene glycol (MIRALAX) packet 17 g, 17 g, Oral, DAILY PRN, Joshua Cartagena MD    ondansetron (ZOFRAN ODT) tablet 4 mg, 4 mg, Oral, Q8H PRN **OR** ondansetron (ZOFRAN) injection 4 mg, 4 mg, IntraVENous, Q6H PRN, Joshua Cartagena MD    enoxaparin (LOVENOX) injection 30 mg, 30 mg, SubCUTAneous, DAILY, Joshua Cartagena MD, 30 mg at 09/07/21 0806    cefTRIAXone (ROCEPHIN) 1 g in sterile water (preservative free) 10 mL IV syringe, 1 g, IntraVENous, Q24H, Joshua Cartagena MD, 1 g at 09/07/21 0338    metroNIDAZOLE (FLAGYL) IVPB premix 500 mg, 500 mg, IntraVENous, Q8H, Joshua Cartagena MD, Last Rate: 100 mL/hr at 09/07/21 0335, 500 mg at 09/07/21 0335    0.9% sodium chloride infusion, 100 mL/hr, IntraVENous, CONTINUOUS, Joshua Cartagena MD, Last Rate: 100 mL/hr at 09/06/21 0501, 100 mL/hr at 09/06/21 0501    famotidine (PEPCID) tablet 20 mg, 20 mg, Oral, DAILY, Joshua Cartagena MD, 20 mg at 09/07/21 0806       ASSESSMENT:    1. Acute metabolic encephalopathy:  -Appears to be resolving as kidney function improves     2. PRAVEEN  -resolved     3. Infectious diarrhea/Sepsis:  -Continue Rocephin and Flagyl  -CT scan abdomen pelvis revealed no acute process although further review shows possible colitis.  -Enteric cultures pending  -C. difficile pending  -Consider GI consultation     4.  Urinary retention  -Xie catheter to be placed, will attempt voiding trial     5.  Essential hypertension:  -bp remains at goal per JNC 8 guidelines  -Continue metoprolol and lisinopril     6. Hypokalemia:  -Replete as needed        Full Code  Dvt Prophylaxis lovenox  GI Prophylaxis pepcid  Disposition: pending clinical improvement and replenish of potassium      Above treatment plan reviewed and discussed with patient in detail at bedside, all questions answered. Care Plan discussed with: Interdisciplinary team    Total time spent with patient: >35 minutes.     Zoie Evans NP

## 2021-09-07 NOTE — PROGRESS NOTES
Bedside and Verbal shift change report given to Wendy Regalado (oncoming nurse) by Enrique Pettit (offgoing nurse). Report included the following information SBAR and MAR.

## 2021-09-08 VITALS
WEIGHT: 131.17 LBS | OXYGEN SATURATION: 95 % | BODY MASS INDEX: 21.85 KG/M2 | RESPIRATION RATE: 16 BRPM | SYSTOLIC BLOOD PRESSURE: 179 MMHG | TEMPERATURE: 98 F | HEART RATE: 79 BPM | HEIGHT: 65 IN | DIASTOLIC BLOOD PRESSURE: 80 MMHG

## 2021-09-08 LAB
ALBUMIN SERPL-MCNC: 3 G/DL (ref 3.5–5)
ALBUMIN/GLOB SERPL: 0.7 {RATIO} (ref 1.1–2.2)
ALP SERPL-CCNC: 70 U/L (ref 45–117)
ALT SERPL-CCNC: 28 U/L (ref 12–78)
ANION GAP SERPL CALC-SCNC: 6 MMOL/L (ref 5–15)
AST SERPL W P-5'-P-CCNC: 32 U/L (ref 15–37)
BASOPHILS # BLD: 0.1 K/UL (ref 0–0.1)
BASOPHILS NFR BLD: 1 % (ref 0–1)
BILIRUB SERPL-MCNC: 0.3 MG/DL (ref 0.2–1)
BUN SERPL-MCNC: 7 MG/DL (ref 6–20)
BUN/CREAT SERPL: 14 (ref 12–20)
C DIFF TOX GENS STL QL NAA+PROBE: NEGATIVE
CA-I BLD-MCNC: 8.6 MG/DL (ref 8.5–10.1)
CAMPYLOBACTER SPECIES, DNA: NEGATIVE
CHLORIDE SERPL-SCNC: 105 MMOL/L (ref 97–108)
CO2 SERPL-SCNC: 29 MMOL/L (ref 21–32)
CREAT SERPL-MCNC: 0.51 MG/DL (ref 0.55–1.02)
DIFFERENTIAL METHOD BLD: ABNORMAL
ENTEROTOXIGEN E COLI, DNA: NEGATIVE
EOSINOPHIL # BLD: 0.4 K/UL (ref 0–0.4)
EOSINOPHIL NFR BLD: 5 % (ref 0–7)
ERYTHROCYTE [DISTWIDTH] IN BLOOD BY AUTOMATED COUNT: 12.5 % (ref 11.5–14.5)
GLOBULIN SER CALC-MCNC: 4.1 G/DL (ref 2–4)
GLUCOSE SERPL-MCNC: 101 MG/DL (ref 65–100)
HCT VFR BLD AUTO: 35.3 % (ref 35–47)
HGB BLD-MCNC: 12.3 G/DL (ref 11.5–16)
IMM GRANULOCYTES # BLD AUTO: 0 K/UL (ref 0–0.04)
IMM GRANULOCYTES NFR BLD AUTO: 1 % (ref 0–0.5)
LYMPHOCYTES # BLD: 2.2 K/UL (ref 0.8–3.5)
LYMPHOCYTES NFR BLD: 29 % (ref 12–49)
MCH RBC QN AUTO: 31.9 PG (ref 26–34)
MCHC RBC AUTO-ENTMCNC: 34.8 G/DL (ref 30–36.5)
MCV RBC AUTO: 91.7 FL (ref 80–99)
MONOCYTES # BLD: 0.8 K/UL (ref 0–1)
MONOCYTES NFR BLD: 11 % (ref 5–13)
NEUTS SEG # BLD: 3.9 K/UL (ref 1.8–8)
NEUTS SEG NFR BLD: 53 % (ref 32–75)
NRBC # BLD: 0 K/UL (ref 0–0.01)
NRBC BLD-RTO: 0 PER 100 WBC
P SHIGELLOIDES DNA STL QL NAA+PROBE: NEGATIVE
PLATELET # BLD AUTO: 333 K/UL (ref 150–400)
PMV BLD AUTO: 10.8 FL (ref 8.9–12.9)
POTASSIUM SERPL-SCNC: 3 MMOL/L (ref 3.5–5.1)
PROT SERPL-MCNC: 7.1 G/DL (ref 6.4–8.2)
RBC # BLD AUTO: 3.85 M/UL (ref 3.8–5.2)
SALMONELLA SPECIES, DNA: NEGATIVE
SHIGA TOXIN PRODUCING, DNA: NEGATIVE
SHIGELLA SP+EIEC IPAH STL QL NAA+PROBE: NEGATIVE
SODIUM SERPL-SCNC: 140 MMOL/L (ref 136–145)
VIBRIO SPECIES, DNA: NEGATIVE
WBC # BLD AUTO: 7.3 K/UL (ref 3.6–11)
Y. ENTEROCOLITICA, DNA: NEGATIVE

## 2021-09-08 PROCEDURE — 74011250636 HC RX REV CODE- 250/636: Performed by: INTERNAL MEDICINE

## 2021-09-08 PROCEDURE — 74011000250 HC RX REV CODE- 250: Performed by: INTERNAL MEDICINE

## 2021-09-08 PROCEDURE — 36415 COLL VENOUS BLD VENIPUNCTURE: CPT

## 2021-09-08 PROCEDURE — 80053 COMPREHEN METABOLIC PANEL: CPT

## 2021-09-08 PROCEDURE — 74011250637 HC RX REV CODE- 250/637: Performed by: INTERNAL MEDICINE

## 2021-09-08 PROCEDURE — 74011250636 HC RX REV CODE- 250/636: Performed by: NURSE PRACTITIONER

## 2021-09-08 PROCEDURE — 74011250637 HC RX REV CODE- 250/637: Performed by: NURSE PRACTITIONER

## 2021-09-08 PROCEDURE — 85025 COMPLETE CBC W/AUTO DIFF WBC: CPT

## 2021-09-08 RX ORDER — METRONIDAZOLE 500 MG/1
500 TABLET ORAL 3 TIMES DAILY
Qty: 9 TABLET | Refills: 0 | Status: SHIPPED | OUTPATIENT
Start: 2021-09-08 | End: 2021-09-11

## 2021-09-08 RX ORDER — AMLODIPINE BESYLATE 5 MG/1
5 TABLET ORAL DAILY
Qty: 30 TABLET | Refills: 0 | Status: SHIPPED | OUTPATIENT
Start: 2021-09-08 | End: 2021-10-08

## 2021-09-08 RX ORDER — POTASSIUM CHLORIDE 1.5 G/1.77G
20 POWDER, FOR SOLUTION ORAL 2 TIMES DAILY WITH MEALS
Qty: 28 PACKET | Refills: 0 | Status: SHIPPED | OUTPATIENT
Start: 2021-09-08 | End: 2021-09-22

## 2021-09-08 RX ORDER — BETHANECHOL CHLORIDE 10 MG/1
5 TABLET ORAL
Status: DISCONTINUED | OUTPATIENT
Start: 2021-09-08 | End: 2021-09-08 | Stop reason: HOSPADM

## 2021-09-08 RX ORDER — AMLODIPINE BESYLATE 5 MG/1
5 TABLET ORAL DAILY
Status: DISCONTINUED | OUTPATIENT
Start: 2021-09-08 | End: 2021-09-08 | Stop reason: HOSPADM

## 2021-09-08 RX ORDER — POTASSIUM CHLORIDE 1.5 G/1.77G
40 POWDER, FOR SOLUTION ORAL 2 TIMES DAILY WITH MEALS
Status: DISCONTINUED | OUTPATIENT
Start: 2021-09-08 | End: 2021-09-08 | Stop reason: HOSPADM

## 2021-09-08 RX ORDER — POTASSIUM CHLORIDE 7.45 MG/ML
10 INJECTION INTRAVENOUS
Status: DISPENSED | OUTPATIENT
Start: 2021-09-08 | End: 2021-09-08

## 2021-09-08 RX ADMIN — METOPROLOL SUCCINATE 50 MG: 50 TABLET, EXTENDED RELEASE ORAL at 09:25

## 2021-09-08 RX ADMIN — FAMOTIDINE 20 MG: 20 TABLET ORAL at 09:25

## 2021-09-08 RX ADMIN — AMLODIPINE BESYLATE 5 MG: 5 TABLET ORAL at 12:27

## 2021-09-08 RX ADMIN — ACETAMINOPHEN 650 MG: 325 TABLET ORAL at 05:01

## 2021-09-08 RX ADMIN — POTASSIUM CHLORIDE 10 MEQ: 7.46 INJECTION, SOLUTION INTRAVENOUS at 10:28

## 2021-09-08 RX ADMIN — ENOXAPARIN SODIUM 30 MG: 30 INJECTION SUBCUTANEOUS at 09:25

## 2021-09-08 RX ADMIN — GABAPENTIN 300 MG: 300 CAPSULE ORAL at 09:25

## 2021-09-08 RX ADMIN — SODIUM CHLORIDE 100 ML/HR: 9 INJECTION, SOLUTION INTRAVENOUS at 04:04

## 2021-09-08 RX ADMIN — CEFTRIAXONE 1 G: 1 INJECTION, POWDER, FOR SOLUTION INTRAMUSCULAR; INTRAVENOUS at 04:02

## 2021-09-08 RX ADMIN — LISINOPRIL 40 MG: 10 TABLET ORAL at 09:24

## 2021-09-08 RX ADMIN — POTASSIUM CHLORIDE 40 MEQ: 1.5 FOR SOLUTION ORAL at 09:25

## 2021-09-08 RX ADMIN — BETHANECHOL CHLORIDE 5 MG: 10 TABLET ORAL at 12:27

## 2021-09-08 RX ADMIN — METRONIDAZOLE 500 MG: 500 INJECTION, SOLUTION INTRAVENOUS at 04:02

## 2021-09-08 RX ADMIN — Medication 10 ML: at 05:01

## 2021-09-08 RX ADMIN — ACETAMINOPHEN 650 MG: 325 TABLET ORAL at 12:27

## 2021-09-08 NOTE — PROGRESS NOTES
Bedside and Verbal shift change report given to Santy Plasencia (oncoming nurse) by Valarie Jimenez (offgoing nurse). Report included the following information SBAR and MAR.

## 2021-09-08 NOTE — DISCHARGE INSTRUCTIONS
* Follow-up Care/Patient Instructions:   Activity: Activity as tolerated  Diet: Cardiac Diet  Wound Care: None needed    Patient to continue all medications as prescribed  Patient to maintain all f/u appointments  Patient counseled on risk factors associated with continued usage narcotics  Patient medically stable for discharge    Follow-up Information     Follow up With Specialties Details Why Contact Marla Ceron MD Nurse Practitioner In 1 week  [phone] EMERGENCY DEPT Emergency Medicine  As needed, If symptoms worsen [address] [facility] [address] [phone]

## 2021-09-08 NOTE — PROGRESS NOTES
Discharge plan of care/case management plan validated with provider discharge order. I have reviewed discharge instructions, follow up appointments, and medications with patient. Patient verbalized understanding. Patient escorted with all belongings via wheelchair with all belongings to the front entrance with family.

## 2021-09-08 NOTE — DISCHARGE SUMMARY
Admit date: 9/5/2021   Admitting Provider: Isiah Newton MD    Discharge date: 9/8/2021  Discharging Provider: Angela Pearson NP      * Admission Diagnoses: Hypokalemia [E87.6]  Altered mental status [R41.82]    * Discharge Diagnoses:    Hospital Problems as of 9/8/2021 Date Reviewed: 3/24/2021        Codes Class Noted - Resolved POA    * (Principal) Hypokalemia ICD-10-CM: E87.6  ICD-9-CM: 276.8  9/6/2021 - Present Unknown        Altered mental status ICD-10-CM: R41.82  ICD-9-CM: 780.97  9/6/2021 - Present Unknown              * Hospital Course: This 68-year-old female admitted on 09/05/2021 secondary to metabolic encephalopathy, diarrhea and acute kidney injury. CT scan of the abdomen pelvis revealed no acute process. CT of the head revealed no acute intracranial process. Procalcitonin is elevated although white count is trending down appropriately. Creatinine improved from 2.46 at time of admission and trended down appropriately. Blood culture pending and as well as enteric cultures and C. difficile. Chest x-ray was normal. Severe hypokalemia noted, replenished with oral and IV k. Home diuretic discontinued due to chronic hypokalemia. * Procedures:   * No surgery found *      Consults:     Significant Diagnostic Studies:   CT ABD PELV WO CONT   Final Result   No active findings       CT dose reduction was achieved through use of a standardized protocol tailored   for this examination and automatic exposure control for dose modulation.       CT HEAD WO CONT   Final Result   1. There are smaller punctate foci of CSF density demonstrated along the   anterior limb of the right internal capsule and within the right anterior   periventricular/centrum semiovale white matter most compatible with tiny old   lacunar infarcts. 2.  There are milder microvascular changes with a slight interval progression   when compared to prior imaging.    3.  This examination is negative for acute intracranial pathology.                 XR CHEST SNGL V   Final Result   Findings/impression:       Slightly enlarged cardiac size. Clear lungs. No infiltrate. No overt edema. No   pneumothorax or effusion. Extensive mitral valve calcifications are present;   needs clinical correlation. No definite acute cardiopulmonary process noted.             Discharge Exam:  I've personally examined patient on date of discharge. Vitals and nursing note reviewed. Constitutional:       Appearance: Normal appearance. HENT:      Head: Normocephalic. Nose: Nose normal.      Mouth/Throat:      Mouth: Mucous membranes are moist.   Eyes:      Conjunctiva/sclera: Conjunctivae normal.   Cardiovascular:      Rate and Rhythm: Normal rate and regular rhythm. Pulses: Normal pulses. Heart sounds: Normal heart sounds. Pulmonary:      Effort: Pulmonary effort is normal.      Breath sounds: Normal breath sounds. Abdominal:      General: Bowel sounds are normal.      Palpations: Abdomen is soft. Genitourinary:     Comments: Xie in situ  Musculoskeletal:         General: Normal range of motion. Cervical back: Normal range of motion. Skin:     General: Skin is warm and dry. Capillary Refill: Capillary refill takes less than 2 seconds. Neurological:      Mental Status: She is alert and oriented to person, place, and time. Psychiatric:         Mood and Affect: Mood normal.         Behavior: Behavior normal.     * Discharge Condition: stable  * Disposition: Home    Discharge Medications:  Current Discharge Medication List      START taking these medications    Details   potassium chloride (KLOR-CON) 20 mEq pack Take 1 Packet by mouth two (2) times daily (with meals) for 14 days. Qty: 28 Packet, Refills: 0  Start date: 9/8/2021, End date: 9/22/2021      metroNIDAZOLE (FlagyL) 500 mg tablet Take 1 Tablet by mouth three (3) times daily for 3 days.   Qty: 9 Tablet, Refills: 0  Start date: 9/8/2021, End date: 9/11/2021      amLODIPine (NORVASC) 5 mg tablet Take 1 Tablet by mouth daily for 30 days. Qty: 30 Tablet, Refills: 0  Start date: 9/8/2021, End date: 10/8/2021         CONTINUE these medications which have NOT CHANGED    Details   gabapentin (NEURONTIN) 300 mg capsule Take 300 mg by mouth three (3) times daily. lisinopriL (PRINIVIL, ZESTRIL) 40 mg tablet       simvastatin (ZOCOR) 20 mg tablet Take 20 mg by mouth At bedtime. diclofenac (VOLTAREN) 1 % gel by TransDERmal route.      lidocaine (LIDODERM) 5 %       nortriptyline (PAMELOR) 25 mg capsule Take 25 mg by mouth At bedtime. calcium carbonate (CALTREX) 600 mg calcium (1,500 mg) tablet Take 600 mg by mouth two (2) times a day. metoprolol succinate (TOPROL-XL) 50 mg XL tablet       estradioL (ESTRACE) 0.5 mg tablet Take 0.5 mg by mouth daily. famotidine (PEPCID) 20 mg tablet Take 20 mg by mouth two (2) times a day. STOP taking these medications       hydroCHLOROthiazide (HYDRODIURIL) 12.5 mg tablet Comments:   Reason for Stopping:         oxyCODONE IR (ROXICODONE) 10 mg tab immediate release tablet Comments:   Reason for Stopping:               * Follow-up Care/Patient Instructions:   Activity: Activity as tolerated  Diet: Cardiac Diet  Wound Care: None needed    Patient to continue all medications as prescribed  Patient to maintain all f/u appointments  Patient counseled on risk factors associated with continued usage narcotics  Patient medically stable for discharge pending repeat potassium remains stable    Follow-up Information     Follow up With Specialties Details Why Contact Toya Bhagat MD Nurse Practitioner In 1 week  [address] EMERGENCY DEPT Emergency Medicine  As needed, If symptoms worsen [address] [facility] [address]  [phone]          Time Spent: > 35 minutes    CC: Madeleine Sancehz MD    Signed:  Zoie Evans NP  9/8/2021  11:49 AM

## 2021-09-09 LAB — WBC,FECAL,WBCF: NORMAL /HPF (ref 0–4)

## 2021-09-12 LAB
BACTERIA SPEC CULT: NORMAL
SPECIAL REQUESTS,SREQ: NORMAL

## 2022-03-08 ENCOUNTER — OFFICE VISIT (OUTPATIENT)
Dept: PODIATRY | Age: 77
End: 2022-03-08
Payer: MEDICARE

## 2022-03-08 VITALS — SYSTOLIC BLOOD PRESSURE: 189 MMHG | DIASTOLIC BLOOD PRESSURE: 90 MMHG | TEMPERATURE: 98 F | HEART RATE: 76 BPM

## 2022-03-08 DIAGNOSIS — G89.29 CHRONIC BACK PAIN, UNSPECIFIED BACK LOCATION, UNSPECIFIED BACK PAIN LATERALITY: Primary | ICD-10-CM

## 2022-03-08 DIAGNOSIS — L60.0 INGROWN NAIL OF GREAT TOE OF LEFT FOOT: ICD-10-CM

## 2022-03-08 DIAGNOSIS — M54.9 CHRONIC BACK PAIN, UNSPECIFIED BACK LOCATION, UNSPECIFIED BACK PAIN LATERALITY: Primary | ICD-10-CM

## 2022-03-08 PROCEDURE — 3017F COLORECTAL CA SCREEN DOC REV: CPT | Performed by: PODIATRIST

## 2022-03-08 PROCEDURE — G8432 DEP SCR NOT DOC, RNG: HCPCS | Performed by: PODIATRIST

## 2022-03-08 PROCEDURE — G8420 CALC BMI NORM PARAMETERS: HCPCS | Performed by: PODIATRIST

## 2022-03-08 PROCEDURE — G8400 PT W/DXA NO RESULTS DOC: HCPCS | Performed by: PODIATRIST

## 2022-03-08 PROCEDURE — 1090F PRES/ABSN URINE INCON ASSESS: CPT | Performed by: PODIATRIST

## 2022-03-08 PROCEDURE — 1101F PT FALLS ASSESS-DOCD LE1/YR: CPT | Performed by: PODIATRIST

## 2022-03-08 PROCEDURE — G8427 DOCREV CUR MEDS BY ELIG CLIN: HCPCS | Performed by: PODIATRIST

## 2022-03-08 PROCEDURE — G8536 NO DOC ELDER MAL SCRN: HCPCS | Performed by: PODIATRIST

## 2022-03-08 PROCEDURE — 99203 OFFICE O/P NEW LOW 30 MIN: CPT | Performed by: PODIATRIST

## 2022-03-08 NOTE — PROGRESS NOTES
1. Have you been to the ER, urgent care clinic since your last visit? Hospitalized since your last visit? No    2. Have you seen or consulted any other health care providers outside of the [address] [place] [address] since your last visit? Include any pap smears or colon screening.  No     Chief Complaint   Patient presents with    Foot Exam    Nail Problem

## 2022-03-18 PROBLEM — E87.6 HYPOKALEMIA: Status: ACTIVE | Noted: 2021-09-06

## 2022-03-19 PROBLEM — R41.82 ALTERED MENTAL STATUS: Status: ACTIVE | Noted: 2021-09-06

## 2022-03-20 PROBLEM — H93.13 TINNITUS OF BOTH EARS: Status: ACTIVE | Noted: 2021-03-24

## 2022-03-28 NOTE — PROGRESS NOTES
[facility]    Subjective:         Patient is a 78 y.o. female who is being seen as a new pt for left great toe pain. Patient states she is suffering with pain for the past few weeks. She denies any overt trauma. She states pain was level of 5 out of 10 and is located to the left great toe. She denies any radiation of the pain but does describe the pain as sharp. She denies any changes in her activity level or shoe gear. She states diagnostic testing is never been performed to confirm a diagnosis. She admits to local erythema and edema but denies any systemic signs of infection. She denies any other pedal complaints    Past Medical History:   Diagnosis Date    Back pain     Herniated disc, cervical     Hyperlipemia     Hypertension     Osteoarthritis     Scoliosis     Shingles     Shoulder pain     Spinal stenosis     Torn meniscus     Vaginal prolapse     Vertigo      Past Surgical History:   Procedure Laterality Date    HX HYSTERECTOMY         Family History   Problem Relation Age of Onset    Cancer Mother     Heart Disease Brother       Social History     Tobacco Use    Smoking status: Never Smoker    Smokeless tobacco: Never Used   Substance Use Topics    Alcohol use: Never     Allergies   Allergen Reactions    Ciprofloxacin Rash    Penicillins Rash     Prior to Admission medications    Medication Sig Start Date End Date Taking? Authorizing Provider   gabapentin (NEURONTIN) 300 mg capsule Take 300 mg by mouth three (3) times daily. Yes Provider, Historical   lisinopriL (PRINIVIL, ZESTRIL) 40 mg tablet  1/12/21  Yes Provider, Historical   simvastatin (ZOCOR) 20 mg tablet Take 20 mg by mouth At bedtime. Yes Provider, Historical   diclofenac (VOLTAREN) 1 % gel by TransDERmal route. Yes Provider, Historical   lidocaine (LIDODERM) 5 %  3/12/21  Yes Provider, Historical   nortriptyline (PAMELOR) 25 mg capsule Take 25 mg by mouth At bedtime.    Yes Provider, Historical   calcium carbonate (CALTREX) 600 mg calcium (1,500 mg) tablet Take 600 mg by mouth two (2) times a day. Yes Provider, Historical   metoprolol succinate (TOPROL-XL) 50 mg XL tablet  3/20/21  Yes Provider, Historical   estradioL (ESTRACE) 0.5 mg tablet Take 0.5 mg by mouth daily. Yes Provider, Historical   famotidine (PEPCID) 20 mg tablet Take 20 mg by mouth two (2) times a day. Yes Provider, Historical       Review of Systems   Constitutional: Negative. HENT: Negative. Eyes: Negative. Respiratory: Negative. Cardiovascular: Negative. Gastrointestinal: Negative. Endocrine: Negative. Genitourinary: Negative. Musculoskeletal: Positive for back pain. Skin: Negative. Allergic/Immunologic: Negative. Neurological: Positive for numbness. Hematological: Negative. Psychiatric/Behavioral: Negative. All other systems reviewed and are negative. Objective:     Visit Vitals  BP (!) 189/90 (BP 1 Location: Left upper arm, BP Patient Position: Sitting, BP Cuff Size: Adult)   Pulse 76   Temp 98 °F (36.7 °C) (Temporal)       Physical Exam  Vitals reviewed. Constitutional:       Appearance: She is normal weight. Cardiovascular:      Pulses:           Dorsalis pedis pulses are 2+ on the right side and 2+ on the left side. Posterior tibial pulses are 2+ on the right side and 2+ on the left side. Pulmonary:      Effort: Pulmonary effort is normal.   Musculoskeletal:      Right lower leg: No edema. Left lower leg: No edema. Right foot: Normal range of motion. No deformity or bunion. Left foot: Normal range of motion. No deformity or bunion. Feet:      Right foot:      Protective Sensation: 10 sites tested. 10 sites sensed. Skin integrity: Skin integrity normal.      Toenail Condition: Right toenails are normal.      Left foot:      Protective Sensation: 10 sites tested. 10 sites sensed. Skin integrity: Erythema present.       Toenail Condition: Left toenails are ingrown. Lymphadenopathy:      Lower Body: No right inguinal adenopathy. No left inguinal adenopathy. Skin:     General: Skin is warm. Capillary Refill: Capillary refill takes 2 to 3 seconds. Neurological:      Mental Status: She is alert and oriented to person, place, and time. Psychiatric:         Mood and Affect: Mood and affect normal.         Behavior: Behavior is cooperative. Data Review: No results found for this or any previous visit (from the past 24 hour(s)). Impression:       ICD-10-CM ICD-9-CM    1. Chronic back pain, unspecified back location, unspecified back pain laterality  M54.9 724.5 REFERRAL TO ORTHOPEDICS    G89.29 338.29    2. Ingrown nail of great toe of left foot  L60.0 703.0          Recommendation:     Patient seen and evaluated in the office  Discussed and educated patient regarding her current medical condition  A slant back procedure was performed the offending nail border of the left great toe with a nail nipper without incident. Patient tolerated well no dressings needed. Instructed patient that if her symptoms persist, she may need a more permanent procedure. Patient verbalized understanding  A referral was provided to orthopedics for further work-up and management regarding her chronic back pain        Waldemar Echeverria, [address] [facility], [address] [place] [address] and Son  Surgery  [address] [place] [address]  O: [phone]  F: [phone]  C: [phone]

## 2022-09-28 ENCOUNTER — OFFICE VISIT (OUTPATIENT)
Dept: PODIATRY | Age: 77
End: 2022-09-28
Payer: MEDICARE

## 2022-09-28 VITALS
BODY MASS INDEX: 21.83 KG/M2 | SYSTOLIC BLOOD PRESSURE: 142 MMHG | HEART RATE: 80 BPM | WEIGHT: 131 LBS | DIASTOLIC BLOOD PRESSURE: 63 MMHG | TEMPERATURE: 97.8 F | HEIGHT: 65 IN

## 2022-09-28 DIAGNOSIS — G89.4 CHRONIC PAIN SYNDROME: ICD-10-CM

## 2022-09-28 DIAGNOSIS — G62.9 NEUROPATHY: Primary | ICD-10-CM

## 2022-09-28 PROCEDURE — 1101F PT FALLS ASSESS-DOCD LE1/YR: CPT | Performed by: PODIATRIST

## 2022-09-28 PROCEDURE — G8432 DEP SCR NOT DOC, RNG: HCPCS | Performed by: PODIATRIST

## 2022-09-28 PROCEDURE — G8427 DOCREV CUR MEDS BY ELIG CLIN: HCPCS | Performed by: PODIATRIST

## 2022-09-28 PROCEDURE — G8420 CALC BMI NORM PARAMETERS: HCPCS | Performed by: PODIATRIST

## 2022-09-28 PROCEDURE — G8536 NO DOC ELDER MAL SCRN: HCPCS | Performed by: PODIATRIST

## 2022-09-28 PROCEDURE — 99213 OFFICE O/P EST LOW 20 MIN: CPT | Performed by: PODIATRIST

## 2022-09-28 PROCEDURE — 1090F PRES/ABSN URINE INCON ASSESS: CPT | Performed by: PODIATRIST

## 2022-09-28 PROCEDURE — 1123F ACP DISCUSS/DSCN MKR DOCD: CPT | Performed by: PODIATRIST

## 2022-09-28 PROCEDURE — G8400 PT W/DXA NO RESULTS DOC: HCPCS | Performed by: PODIATRIST

## 2022-09-28 PROCEDURE — 3017F COLORECTAL CA SCREEN DOC REV: CPT | Performed by: PODIATRIST

## 2022-09-28 NOTE — PROGRESS NOTES
Chief Complaint   Patient presents with    Nail Problem    Foot Swelling    Foot Pain     Pt states she gets sharp pains in her feet that feel better when she holds her feet up     1. \"Have you been to the ER, urgent care clinic since your last visit? Hospitalized since your last visit? \" No    2. \"Have you seen or consulted any other health care providers outside of the [address] [place] [address] since your last visit? \" No     3. For patients aged 39-70: Has the patient had a colonoscopy / FIT/ Cologuard? NA - based on age      If the patient is female:    4. For patients aged 41-77: Has the patient had a mammogram within the past 2 years? NA - based on age or sex      11. For patients aged 21-65: Has the patient had a pap smear?  NA - based on age or sex

## 2022-10-27 NOTE — PROGRESS NOTES
[facility]    Subjective:         Patient is a 78 y.o. female who is being seen as a returning patient for lower extremity pain. She states she suffers from burning/tingling in her feet. She states when she puts her feet up they feel better. She states the pain was level of 9 out of 10. She states she is currently under pain management and receives multiple controlled substances each month on a regular basis. She denies any recent trauma. She denies any breaks in skin or local/systemic signs infection. She denies any recent changes in her shoe gear. She denies any other pedal complaints      Past Medical History:   Diagnosis Date    Back pain     Herniated disc, cervical     Hyperlipemia     Hypertension     Osteoarthritis     Scoliosis     Shingles     Shoulder pain     Spinal stenosis     Torn meniscus     Vaginal prolapse     Vertigo      Past Surgical History:   Procedure Laterality Date    HX HYSTERECTOMY         Family History   Problem Relation Age of Onset    Cancer Mother     Heart Disease Brother       Social History     Tobacco Use    Smoking status: Never    Smokeless tobacco: Never   Substance Use Topics    Alcohol use: Never     Allergies   Allergen Reactions    Ciprofloxacin Rash    Penicillins Rash     Prior to Admission medications    Medication Sig Start Date End Date Taking? Authorizing Provider   gabapentin (NEURONTIN) 300 mg capsule Take 300 mg by mouth three (3) times daily. Provider, Historical   lisinopriL (PRINIVIL, ZESTRIL) 40 mg tablet  1/12/21   Provider, Historical   simvastatin (ZOCOR) 20 mg tablet Take 20 mg by mouth At bedtime. Provider, Historical   diclofenac (VOLTAREN) 1 % gel by TransDERmal route. Provider, Historical   lidocaine (LIDODERM) 5 %  3/12/21   Provider, Historical   nortriptyline (PAMELOR) 25 mg capsule Take 25 mg by mouth At bedtime.     Provider, Historical   calcium carbonate (CALTREX) 600 mg calcium (1,500 mg) tablet Take 600 mg by mouth two (2) times a day. Provider, Historical   metoprolol succinate (TOPROL-XL) 50 mg XL tablet  3/20/21   Provider, Historical   estradioL (ESTRACE) 0.5 mg tablet Take 0.5 mg by mouth daily. Provider, Historical   famotidine (PEPCID) 20 mg tablet Take 20 mg by mouth two (2) times a day. Provider, Historical       Review of Systems   Constitutional: Negative. HENT: Negative. Eyes: Negative. Respiratory: Negative. Cardiovascular: Negative. Gastrointestinal: Negative. Endocrine: Negative. Genitourinary: Negative. Musculoskeletal:  Positive for back pain. Skin: Negative. Allergic/Immunologic: Negative. Neurological:  Positive for numbness. Hematological: Negative. Psychiatric/Behavioral: Negative. All other systems reviewed and are negative. Objective:     Visit Vitals  BP (!) 142/63 (BP 1 Location: Left upper arm, BP Patient Position: Sitting, BP Cuff Size: Small adult)   Pulse 80   Temp 97.8 °F (36.6 °C) (Temporal)   Ht 5' 5\" (1.651 m)   Wt 131 lb (59.4 kg)   BMI 21.80 kg/m²       Physical Exam  Vitals reviewed. Constitutional:       Appearance: She is normal weight. Cardiovascular:      Pulses:           Dorsalis pedis pulses are 2+ on the right side and 2+ on the left side. Posterior tibial pulses are 2+ on the right side and 2+ on the left side. Pulmonary:      Effort: Pulmonary effort is normal.   Musculoskeletal:      Right lower leg: No edema. Left lower leg: No edema. Right foot: Normal range of motion. No deformity or bunion. Left foot: Normal range of motion. No deformity or bunion. Feet:      Right foot:      Protective Sensation: 10 sites tested. 10 sites sensed. Skin integrity: Skin integrity normal.      Toenail Condition: Right toenails are normal.      Left foot:      Protective Sensation: 10 sites tested. 10 sites sensed.       Skin integrity: Skin integrity normal.      Toenail Condition: Left toenails are normal.   Lymphadenopathy:      Lower Body: No right inguinal adenopathy. No left inguinal adenopathy. Skin:     General: Skin is warm. Capillary Refill: Capillary refill takes 2 to 3 seconds. Neurological:      Mental Status: She is alert and oriented to person, place, and time. Comments: Paresthesias/burning noted   Psychiatric:         Mood and Affect: Mood and affect normal.         Behavior: Behavior is cooperative. Data Review: No results found for this or any previous visit (from the past 24 hour(s)). Impression:       ICD-10-CM ICD-9-CM    1. Neuropathy  G62.9 355.9       2. Chronic pain syndrome  G89.4 338.4           Recommendation:     Patient seen and evaluated in the office  Discussed and educated patient regarding her current medical condition  In-depth conversation had regarding her lower extremity neuropathic pain and chronic pain syndrome. She currently works with a pain management doctor and is receiving multiple controlled substance prescriptions each month. Educated patient that studies have shown that patients who are receiving chronic pain treatments also experience increased psychosomatic feelings of pain. Encourage patient to continue with her current treatment. Patient verbalized understanding of all discussed today in the office        Elizabeth Prince.  Kristina Vogt, [address] [facility], [facility]  [address] [place] [address]  O: [phone]  F: [phone]  C: [phone]

## 2023-08-26 ENCOUNTER — APPOINTMENT (OUTPATIENT)
Facility: HOSPITAL | Age: 78
DRG: 884 | End: 2023-08-26
Payer: MEDICARE

## 2023-08-26 ENCOUNTER — HOSPITAL ENCOUNTER (INPATIENT)
Facility: HOSPITAL | Age: 78
LOS: 3 days | Discharge: NURSING, REHAB OR LONG-TERM CARE | DRG: 884 | End: 2023-08-29
Attending: STUDENT IN AN ORGANIZED HEALTH CARE EDUCATION/TRAINING PROGRAM | Admitting: INTERNAL MEDICINE
Payer: MEDICARE

## 2023-08-26 DIAGNOSIS — N17.9 AKI (ACUTE KIDNEY INJURY) (HCC): ICD-10-CM

## 2023-08-26 DIAGNOSIS — R41.82 ALTERED MENTAL STATUS, UNSPECIFIED ALTERED MENTAL STATUS TYPE: ICD-10-CM

## 2023-08-26 DIAGNOSIS — R55 SYNCOPE, UNSPECIFIED SYNCOPE TYPE: Primary | ICD-10-CM

## 2023-08-26 LAB
ALBUMIN SERPL-MCNC: 4.1 G/DL (ref 3.5–5)
ALBUMIN/GLOB SERPL: 1.1 (ref 1.1–2.2)
ALP SERPL-CCNC: 63 U/L (ref 45–117)
ALT SERPL-CCNC: 33 U/L (ref 12–78)
AMPHET UR QL SCN: NEGATIVE
ANION GAP SERPL CALC-SCNC: 10 MMOL/L (ref 5–15)
APPEARANCE UR: CLEAR
AST SERPL W P-5'-P-CCNC: 22 U/L (ref 15–37)
BACTERIA URNS QL MICRO: NEGATIVE /HPF
BARBITURATES UR QL SCN: NEGATIVE
BASOPHILS # BLD: 0.1 K/UL (ref 0–0.1)
BASOPHILS NFR BLD: 1 % (ref 0–1)
BENZODIAZ UR QL: NEGATIVE
BILIRUB SERPL-MCNC: 0.4 MG/DL (ref 0.2–1)
BILIRUB UR QL: NEGATIVE
BUN SERPL-MCNC: 27 MG/DL (ref 6–20)
BUN/CREAT SERPL: 26 (ref 12–20)
CA-I BLD-MCNC: 9.9 MG/DL (ref 8.5–10.1)
CANNABINOIDS UR QL SCN: NEGATIVE
CHLORIDE SERPL-SCNC: 104 MMOL/L (ref 97–108)
CO2 SERPL-SCNC: 27 MMOL/L (ref 21–32)
COCAINE UR QL SCN: NEGATIVE
COLOR UR: ABNORMAL
CREAT SERPL-MCNC: 1.03 MG/DL (ref 0.55–1.02)
DIFFERENTIAL METHOD BLD: ABNORMAL
EOSINOPHIL # BLD: 0.1 K/UL (ref 0–0.4)
EOSINOPHIL NFR BLD: 2 % (ref 0–7)
EPITH CASTS URNS QL MICRO: ABNORMAL /LPF
ERYTHROCYTE [DISTWIDTH] IN BLOOD BY AUTOMATED COUNT: 12.4 % (ref 11.5–14.5)
GLOBULIN SER CALC-MCNC: 3.6 G/DL (ref 2–4)
GLUCOSE SERPL-MCNC: 134 MG/DL (ref 65–100)
GLUCOSE UR STRIP.AUTO-MCNC: NEGATIVE MG/DL
HCT VFR BLD AUTO: 37.3 % (ref 35–47)
HGB BLD-MCNC: 12.6 G/DL (ref 11.5–16)
HGB UR QL STRIP: NEGATIVE
IMM GRANULOCYTES # BLD AUTO: 0.1 K/UL (ref 0–0.04)
IMM GRANULOCYTES NFR BLD AUTO: 1 % (ref 0–0.5)
KETONES UR QL STRIP.AUTO: 5 MG/DL
LEUKOCYTE ESTERASE UR QL STRIP.AUTO: NEGATIVE
LYMPHOCYTES # BLD: 1.5 K/UL (ref 0.8–3.5)
LYMPHOCYTES NFR BLD: 20 % (ref 12–49)
Lab: ABNORMAL
MCH RBC QN AUTO: 30.4 PG (ref 26–34)
MCHC RBC AUTO-ENTMCNC: 33.8 G/DL (ref 30–36.5)
MCV RBC AUTO: 90.1 FL (ref 80–99)
METHADONE UR QL: NEGATIVE
MONOCYTES # BLD: 0.5 K/UL (ref 0–1)
MONOCYTES NFR BLD: 7 % (ref 5–13)
MUCOUS THREADS URNS QL MICRO: ABNORMAL /LPF
NEUTS SEG # BLD: 5.4 K/UL (ref 1.8–8)
NEUTS SEG NFR BLD: 69 % (ref 32–75)
NITRITE UR QL STRIP.AUTO: NEGATIVE
NRBC # BLD: 0 K/UL (ref 0–0.01)
NRBC BLD-RTO: 0 PER 100 WBC
OPIATES UR QL: POSITIVE
PCP UR QL: NEGATIVE
PH UR STRIP: 6 (ref 5–8)
PLATELET # BLD AUTO: 329 K/UL (ref 150–400)
PMV BLD AUTO: 10.3 FL (ref 8.9–12.9)
POTASSIUM SERPL-SCNC: 3.5 MMOL/L (ref 3.5–5.1)
PROT SERPL-MCNC: 7.7 G/DL (ref 6.4–8.2)
PROT UR STRIP-MCNC: 30 MG/DL
RBC # BLD AUTO: 4.14 M/UL (ref 3.8–5.2)
RBC #/AREA URNS HPF: ABNORMAL /HPF (ref 0–5)
SODIUM SERPL-SCNC: 141 MMOL/L (ref 136–145)
SP GR UR REFRACTOMETRY: 1.02 (ref 1–1.03)
TSH SERPL DL<=0.05 MIU/L-ACNC: 0.83 UIU/ML (ref 0.36–3.74)
URINE CULTURE IF INDICATED: ABNORMAL
UROBILINOGEN UR QL STRIP.AUTO: 0.1 EU/DL (ref 0.1–1)
WBC # BLD AUTO: 7.7 K/UL (ref 3.6–11)
WBC URNS QL MICRO: ABNORMAL /HPF (ref 0–4)

## 2023-08-26 PROCEDURE — 85025 COMPLETE CBC W/AUTO DIFF WBC: CPT

## 2023-08-26 PROCEDURE — 6360000002 HC RX W HCPCS: Performed by: INTERNAL MEDICINE

## 2023-08-26 PROCEDURE — 36415 COLL VENOUS BLD VENIPUNCTURE: CPT

## 2023-08-26 PROCEDURE — 96361 HYDRATE IV INFUSION ADD-ON: CPT

## 2023-08-26 PROCEDURE — 6360000002 HC RX W HCPCS: Performed by: STUDENT IN AN ORGANIZED HEALTH CARE EDUCATION/TRAINING PROGRAM

## 2023-08-26 PROCEDURE — 81001 URINALYSIS AUTO W/SCOPE: CPT

## 2023-08-26 PROCEDURE — 84443 ASSAY THYROID STIM HORMONE: CPT

## 2023-08-26 PROCEDURE — 80307 DRUG TEST PRSMV CHEM ANLYZR: CPT

## 2023-08-26 PROCEDURE — 1100000000 HC RM PRIVATE

## 2023-08-26 PROCEDURE — 71045 X-RAY EXAM CHEST 1 VIEW: CPT

## 2023-08-26 PROCEDURE — 6370000000 HC RX 637 (ALT 250 FOR IP): Performed by: INTERNAL MEDICINE

## 2023-08-26 PROCEDURE — 80053 COMPREHEN METABOLIC PANEL: CPT

## 2023-08-26 PROCEDURE — 96374 THER/PROPH/DIAG INJ IV PUSH: CPT

## 2023-08-26 PROCEDURE — 70450 CT HEAD/BRAIN W/O DYE: CPT

## 2023-08-26 PROCEDURE — 2580000003 HC RX 258: Performed by: STUDENT IN AN ORGANIZED HEALTH CARE EDUCATION/TRAINING PROGRAM

## 2023-08-26 PROCEDURE — 99285 EMERGENCY DEPT VISIT HI MDM: CPT

## 2023-08-26 RX ORDER — ACETAMINOPHEN 325 MG/1
650 TABLET ORAL EVERY 6 HOURS PRN
Status: CANCELLED | OUTPATIENT
Start: 2023-08-26

## 2023-08-26 RX ORDER — POLYETHYLENE GLYCOL 3350 17 G/17G
17 POWDER, FOR SOLUTION ORAL DAILY PRN
Status: DISCONTINUED | OUTPATIENT
Start: 2023-08-26 | End: 2023-08-29 | Stop reason: HOSPADM

## 2023-08-26 RX ORDER — SODIUM CHLORIDE 9 MG/ML
INJECTION, SOLUTION INTRAVENOUS PRN
Status: CANCELLED | OUTPATIENT
Start: 2023-08-26

## 2023-08-26 RX ORDER — LORAZEPAM 2 MG/ML
1 INJECTION INTRAMUSCULAR EVERY 6 HOURS PRN
Status: DISCONTINUED | OUTPATIENT
Start: 2023-08-26 | End: 2023-08-26

## 2023-08-26 RX ORDER — GABAPENTIN 400 MG/1
400 CAPSULE ORAL 3 TIMES DAILY
Status: DISCONTINUED | OUTPATIENT
Start: 2023-08-27 | End: 2023-08-29 | Stop reason: HOSPADM

## 2023-08-26 RX ORDER — ACETAMINOPHEN 325 MG/1
650 TABLET ORAL EVERY 6 HOURS PRN
Status: DISCONTINUED | OUTPATIENT
Start: 2023-08-26 | End: 2023-08-29 | Stop reason: HOSPADM

## 2023-08-26 RX ORDER — ONDANSETRON 2 MG/ML
4 INJECTION INTRAMUSCULAR; INTRAVENOUS EVERY 6 HOURS PRN
Status: CANCELLED | OUTPATIENT
Start: 2023-08-26

## 2023-08-26 RX ORDER — ESCITALOPRAM OXALATE 10 MG/1
5 TABLET ORAL DAILY
Status: DISCONTINUED | OUTPATIENT
Start: 2023-08-27 | End: 2023-08-29 | Stop reason: HOSPADM

## 2023-08-26 RX ORDER — ATORVASTATIN CALCIUM 40 MG/1
40 TABLET, FILM COATED ORAL NIGHTLY
Status: DISCONTINUED | OUTPATIENT
Start: 2023-08-26 | End: 2023-08-29 | Stop reason: HOSPADM

## 2023-08-26 RX ORDER — ACETAMINOPHEN 650 MG/1
650 SUPPOSITORY RECTAL EVERY 6 HOURS PRN
Status: DISCONTINUED | OUTPATIENT
Start: 2023-08-26 | End: 2023-08-29 | Stop reason: HOSPADM

## 2023-08-26 RX ORDER — OXYCODONE HYDROCHLORIDE 5 MG/1
10 TABLET ORAL EVERY 6 HOURS PRN
Status: DISCONTINUED | OUTPATIENT
Start: 2023-08-26 | End: 2023-08-29 | Stop reason: HOSPADM

## 2023-08-26 RX ORDER — ACETAMINOPHEN 650 MG/1
650 SUPPOSITORY RECTAL EVERY 6 HOURS PRN
Status: CANCELLED | OUTPATIENT
Start: 2023-08-26

## 2023-08-26 RX ORDER — ONDANSETRON 2 MG/ML
4 INJECTION INTRAMUSCULAR; INTRAVENOUS ONCE
Status: COMPLETED | OUTPATIENT
Start: 2023-08-26 | End: 2023-08-26

## 2023-08-26 RX ORDER — ENOXAPARIN SODIUM 100 MG/ML
40 INJECTION SUBCUTANEOUS DAILY
Status: CANCELLED | OUTPATIENT
Start: 2023-08-27

## 2023-08-26 RX ORDER — ONDANSETRON 4 MG/1
4 TABLET, ORALLY DISINTEGRATING ORAL EVERY 8 HOURS PRN
Status: DISCONTINUED | OUTPATIENT
Start: 2023-08-26 | End: 2023-08-29 | Stop reason: HOSPADM

## 2023-08-26 RX ORDER — ONDANSETRON 2 MG/ML
4 INJECTION INTRAMUSCULAR; INTRAVENOUS EVERY 6 HOURS PRN
Status: DISCONTINUED | OUTPATIENT
Start: 2023-08-26 | End: 2023-08-29 | Stop reason: HOSPADM

## 2023-08-26 RX ORDER — 0.9 % SODIUM CHLORIDE 0.9 %
1000 INTRAVENOUS SOLUTION INTRAVENOUS ONCE
Status: COMPLETED | OUTPATIENT
Start: 2023-08-26 | End: 2023-08-26

## 2023-08-26 RX ORDER — SODIUM CHLORIDE 0.9 % (FLUSH) 0.9 %
5-40 SYRINGE (ML) INJECTION PRN
Status: CANCELLED | OUTPATIENT
Start: 2023-08-26

## 2023-08-26 RX ORDER — TRAZODONE HYDROCHLORIDE 50 MG/1
50 TABLET ORAL NIGHTLY PRN
Status: DISCONTINUED | OUTPATIENT
Start: 2023-08-26 | End: 2023-08-29 | Stop reason: HOSPADM

## 2023-08-26 RX ORDER — SODIUM CHLORIDE 0.9 % (FLUSH) 0.9 %
5-40 SYRINGE (ML) INJECTION EVERY 12 HOURS SCHEDULED
Status: CANCELLED | OUTPATIENT
Start: 2023-08-26

## 2023-08-26 RX ORDER — ONDANSETRON 4 MG/1
4 TABLET, ORALLY DISINTEGRATING ORAL EVERY 8 HOURS PRN
Status: CANCELLED | OUTPATIENT
Start: 2023-08-26

## 2023-08-26 RX ORDER — HYDRALAZINE HYDROCHLORIDE 50 MG/1
50 TABLET, FILM COATED ORAL EVERY 8 HOURS SCHEDULED
Status: DISCONTINUED | OUTPATIENT
Start: 2023-08-26 | End: 2023-08-29 | Stop reason: HOSPADM

## 2023-08-26 RX ORDER — MECOBALAMIN 5000 MCG
5 TABLET,DISINTEGRATING ORAL NIGHTLY
Status: DISCONTINUED | OUTPATIENT
Start: 2023-08-26 | End: 2023-08-29 | Stop reason: HOSPADM

## 2023-08-26 RX ORDER — HYDROXYZINE PAMOATE 25 MG/1
50 CAPSULE ORAL 3 TIMES DAILY PRN
Status: DISCONTINUED | OUTPATIENT
Start: 2023-08-26 | End: 2023-08-29 | Stop reason: HOSPADM

## 2023-08-26 RX ORDER — POLYETHYLENE GLYCOL 3350 17 G/17G
17 POWDER, FOR SOLUTION ORAL DAILY PRN
Status: CANCELLED | OUTPATIENT
Start: 2023-08-26

## 2023-08-26 RX ORDER — GABAPENTIN 300 MG/1
600 CAPSULE ORAL 3 TIMES DAILY
Status: DISCONTINUED | OUTPATIENT
Start: 2023-08-26 | End: 2023-08-26

## 2023-08-26 RX ORDER — LIDOCAINE 4 G/G
1 PATCH TOPICAL DAILY
Status: DISCONTINUED | OUTPATIENT
Start: 2023-08-27 | End: 2023-08-29 | Stop reason: HOSPADM

## 2023-08-26 RX ORDER — SODIUM CHLORIDE 9 MG/ML
INJECTION, SOLUTION INTRAVENOUS PRN
Status: DISCONTINUED | OUTPATIENT
Start: 2023-08-26 | End: 2023-08-29 | Stop reason: HOSPADM

## 2023-08-26 RX ORDER — SODIUM CHLORIDE 0.9 % (FLUSH) 0.9 %
5-40 SYRINGE (ML) INJECTION PRN
Status: DISCONTINUED | OUTPATIENT
Start: 2023-08-26 | End: 2023-08-29 | Stop reason: HOSPADM

## 2023-08-26 RX ORDER — ESTRADIOL 0.5 MG/1
0.5 TABLET ORAL DAILY
Status: DISCONTINUED | OUTPATIENT
Start: 2023-08-27 | End: 2023-08-29 | Stop reason: HOSPADM

## 2023-08-26 RX ORDER — PANTOPRAZOLE SODIUM 40 MG/1
40 TABLET, DELAYED RELEASE ORAL
Status: DISCONTINUED | OUTPATIENT
Start: 2023-08-27 | End: 2023-08-29 | Stop reason: HOSPADM

## 2023-08-26 RX ORDER — SODIUM CHLORIDE 0.9 % (FLUSH) 0.9 %
5-40 SYRINGE (ML) INJECTION EVERY 12 HOURS SCHEDULED
Status: DISCONTINUED | OUTPATIENT
Start: 2023-08-26 | End: 2023-08-29 | Stop reason: HOSPADM

## 2023-08-26 RX ORDER — METOPROLOL SUCCINATE 50 MG/1
100 TABLET, EXTENDED RELEASE ORAL DAILY
Status: DISCONTINUED | OUTPATIENT
Start: 2023-08-27 | End: 2023-08-29 | Stop reason: HOSPADM

## 2023-08-26 RX ORDER — ENOXAPARIN SODIUM 100 MG/ML
40 INJECTION SUBCUTANEOUS DAILY
Status: DISCONTINUED | OUTPATIENT
Start: 2023-08-27 | End: 2023-08-29 | Stop reason: HOSPADM

## 2023-08-26 RX ORDER — TRAZODONE HYDROCHLORIDE 50 MG/1
50 TABLET ORAL ONCE
Status: DISCONTINUED | OUTPATIENT
Start: 2023-08-26 | End: 2023-08-26

## 2023-08-26 RX ORDER — ASPIRIN 81 MG/1
81 TABLET, CHEWABLE ORAL DAILY
Status: DISCONTINUED | OUTPATIENT
Start: 2023-08-27 | End: 2023-08-29 | Stop reason: HOSPADM

## 2023-08-26 RX ADMIN — HYDRALAZINE HYDROCHLORIDE 50 MG: 50 TABLET, FILM COATED ORAL at 23:24

## 2023-08-26 RX ADMIN — SODIUM CHLORIDE 1000 ML: 9 INJECTION, SOLUTION INTRAVENOUS at 17:09

## 2023-08-26 RX ADMIN — ONDANSETRON 4 MG: 2 INJECTION INTRAMUSCULAR; INTRAVENOUS at 23:24

## 2023-08-26 RX ADMIN — HYDROXYZINE PAMOATE 50 MG: 50 CAPSULE ORAL at 23:24

## 2023-08-26 RX ADMIN — ATORVASTATIN CALCIUM 40 MG: 40 TABLET, FILM COATED ORAL at 23:24

## 2023-08-26 RX ADMIN — ONDANSETRON 4 MG: 2 INJECTION INTRAMUSCULAR; INTRAVENOUS at 17:07

## 2023-08-26 RX ADMIN — OXYCODONE HYDROCHLORIDE 10 MG: 10 TABLET ORAL at 23:35

## 2023-08-26 ASSESSMENT — PAIN SCALES - GENERAL
PAINLEVEL_OUTOF10: 8
PAINLEVEL_OUTOF10: 10

## 2023-08-26 ASSESSMENT — PAIN DESCRIPTION - LOCATION
LOCATION: BACK
LOCATION: BACK

## 2023-08-26 ASSESSMENT — LIFESTYLE VARIABLES
HOW OFTEN DO YOU HAVE A DRINK CONTAINING ALCOHOL: NEVER
HOW MANY STANDARD DRINKS CONTAINING ALCOHOL DO YOU HAVE ON A TYPICAL DAY: PATIENT DOES NOT DRINK

## 2023-08-26 ASSESSMENT — PAIN - FUNCTIONAL ASSESSMENT: PAIN_FUNCTIONAL_ASSESSMENT: 0-10

## 2023-08-26 NOTE — ED TRIAGE NOTES
Pt presents with son and daughter in law with confusion since last night. Pt is alert to self and place, but not time or situation. Family states it started last night, and pt is getting more agitated.  H/o chronic lumbar pain and HTN, unclear if antihypertensives were taken this morning

## 2023-08-26 NOTE — ED NOTES
Airway: Patent, Managing oral secretions, and No obstruction    Respiratory: Normal Rate , Normal Depth, No acute Distress, and Speaking in full sentences    Cardiac: WNL and ECG Rhythm is NSR    Neuro: AVPU Pt alert, acting abnormally per family, symptoms started last night. Family states she does not know where she is or time. GI: Soft and Non-tender    : WNL    Muscle/Skeletal: reports chronic back pain, walk with walker.           Xiang Webb RN  08/26/23 6992
Assumed care of pt from SUMA Addison.      Rach Arceo, SUMA  08/26/23 0490
Simple: Patient demonstrates quick and easy understanding/Verbalized Understanding

## 2023-08-26 NOTE — ED PROVIDER NOTES
[facility] EMERGENCY DEPT  EMERGENCY DEPARTMENT HISTORY AND PHYSICAL EXAM      Date: 8/26/2023  Patient Name: Harrison Arellano  MRN: [id number]  [address] [facility] 1945  Date of evaluation: 8/26/2023  Provider: Sudhir Shearer MD   Note Started: 4:39 PM EDT 8/26/23    HISTORY OF PRESENT ILLNESS     Chief Complaint   Patient presents with    Altered Mental Status    Hypertension       History Provided By: Patient    HPI: Harrison Arellano is a 70 y.o. female who presents with confusion. Patient is accompanied by her son and daughter-in-law. They state that yesterday evening she became confused, noticed that on their way home, that she was lost which is unusual for her. Lately she has had diarrhea, but denies any abdominal pain, fevers, chest pain, dyspnea, rashes, dysuria, hematuria. No focal neurodeficits, including no slurring of speech, no visual deficits, no difficulty walking, no weakness or numbness in any extremity. PAST MEDICAL HISTORY   Past Medical History:  Past Medical History:   Diagnosis Date    Back pain     Herniated disc, cervical     Hyperlipemia     Hypertension     Osteoarthritis     Scoliosis     Shingles     Shoulder pain     Spinal stenosis     Torn meniscus     Vaginal prolapse     Vertigo        Past Surgical History:  Past Surgical History:   Procedure Laterality Date    HYSTERECTOMY (CERVIX STATUS UNKNOWN)         Family History:  Family History   Problem Relation Age of Onset    Cancer Mother     Heart Disease Brother        Social History:  Social History     Tobacco Use    Smoking status: Never    Smokeless tobacco: Never   Substance Use Topics    Alcohol use: Never    Drug use: Never       Allergies: Allergies   Allergen Reactions    Ciprofloxacin Rash    Penicillins Rash       PCP: MARTHA TAM    Current Meds:   No current facility-administered medications for this encounter.      Current Outpatient Medications   Medication Sig Dispense Refill    calcium carbonate 1500 (600 Ca) MG TABS tablet Take 600 mg by mouth 2 times daily      diclofenac sodium (VOLTAREN) 1 % GEL Place onto the skin      estradiol (ESTRACE) 0.5 MG tablet Take 0.5 mg by mouth daily      famotidine (PEPCID) 20 MG tablet Take 20 mg by mouth 2 times daily      gabapentin (NEURONTIN) 300 MG capsule Take 300 mg by mouth 3 times daily. lidocaine (LIDODERM) 5 % ceived the following from [facility]: Outside name: lidocaine (LIDODERM) 5 %      lisinopril (PRINIVIL;ZESTRIL) 40 MG tablet ceived the following from [facility]: Outside name: lisinopriL (PRINIVIL, ZESTRIL) 40 mg tablet      metoprolol succinate (TOPROL XL) 50 MG extended release tablet ceived the following from [facility]: Outside name: metoprolol succinate (TOPROL-XL) 50 mg XL tablet      nortriptyline (PAMELOR) 25 MG capsule Take 25 mg by mouth nightly      simvastatin (ZOCOR) 20 MG tablet Take 20 mg by mouth nightly         Social Determinants of Health:   Social Determinants of Health     Tobacco Use: Low Risk     Smoking Tobacco Use: Never    Smokeless Tobacco Use: Never    Passive Exposure: Not on file   Alcohol Use: Not At Risk    Frequency of Alcohol Consumption: Never    Average Number of Drinks: Patient does not drink    Frequency of Binge Drinking: Never   Financial Resource Strain: Not on file   Food Insecurity: Not on file   Transportation Needs: Not on file   Physical Activity: Not on file   Stress: Not on file   Social Connections: Not on file   Intimate Partner Violence: Not on file   Depression: Not on file   Housing Stability: Not on file       PHYSICAL EXAM   Physical Exam  Constitutional:       General: She is not in acute distress. Appearance: She is well-developed and normal weight. She is not ill-appearing or toxic-appearing. HENT:      Head: Normocephalic. Eyes:      General: No visual field deficit. Extraocular Movements: Extraocular movements intact.    Cardiovascular:      Rate about these medications      calcium carbonate 1500 (600 Ca) MG Tabs tablet     diclofenac sodium 1 % Gel  Commonly known as: VOLTAREN     estradiol 0.5 MG tablet  Commonly known as: ESTRACE     famotidine 20 MG tablet  Commonly known as: PEPCID     gabapentin 300 MG capsule  Commonly known as: NEURONTIN     lidocaine 5 %  Commonly known as: LIDODERM     lisinopril 40 MG tablet  Commonly known as: PRINIVIL;ZESTRIL     metoprolol succinate 50 MG extended release tablet  Commonly known as: TOPROL XL     nortriptyline 25 MG capsule  Commonly known as: PAMELOR     simvastatin 20 MG tablet  Commonly known as: ZOCOR                DISCONTINUED MEDICATIONS:  Current Discharge Medication List          I am the Primary Clinician of Record. Dewayne Garcia MD (electronically signed)    (Please note that parts of this dictation were completed with voice recognition software. Quite often unanticipated grammatical, syntax, homophones, and other interpretive errors are inadvertently transcribed by the computer software. Please disregards these errors.  Please excuse any errors that have escaped final proofreading.)     Dewayne Garcia MD  08/26/23 1643

## 2023-08-27 LAB
ANION GAP SERPL CALC-SCNC: 8 MMOL/L (ref 5–15)
BASOPHILS # BLD: 0 K/UL (ref 0–0.1)
BASOPHILS NFR BLD: 0 % (ref 0–1)
BUN SERPL-MCNC: 23 MG/DL (ref 6–20)
BUN/CREAT SERPL: 31 (ref 12–20)
CA-I BLD-MCNC: 8.7 MG/DL (ref 8.5–10.1)
CHLORIDE SERPL-SCNC: 108 MMOL/L (ref 97–108)
CHOLEST SERPL-MCNC: 170 MG/DL
CO2 SERPL-SCNC: 23 MMOL/L (ref 21–32)
CREAT SERPL-MCNC: 0.75 MG/DL (ref 0.55–1.02)
DIFFERENTIAL METHOD BLD: ABNORMAL
EOSINOPHIL # BLD: 0.1 K/UL (ref 0–0.4)
EOSINOPHIL NFR BLD: 2 % (ref 0–7)
ERYTHROCYTE [DISTWIDTH] IN BLOOD BY AUTOMATED COUNT: 12.6 % (ref 11.5–14.5)
GLUCOSE SERPL-MCNC: 98 MG/DL (ref 65–100)
HCT VFR BLD AUTO: 29.9 % (ref 35–47)
HDLC SERPL-MCNC: 45 MG/DL
HDLC SERPL: 3.8 (ref 0–5)
HGB BLD-MCNC: 10.3 G/DL (ref 11.5–16)
IMM GRANULOCYTES # BLD AUTO: 0 K/UL (ref 0–0.04)
IMM GRANULOCYTES NFR BLD AUTO: 0 % (ref 0–0.5)
LDLC SERPL CALC-MCNC: 79.4 MG/DL (ref 0–100)
LIPID PANEL: ABNORMAL
LYMPHOCYTES # BLD: 2.2 K/UL (ref 0.8–3.5)
LYMPHOCYTES NFR BLD: 24 % (ref 12–49)
MCH RBC QN AUTO: 30.9 PG (ref 26–34)
MCHC RBC AUTO-ENTMCNC: 34.4 G/DL (ref 30–36.5)
MCV RBC AUTO: 89.8 FL (ref 80–99)
MONOCYTES # BLD: 1 K/UL (ref 0–1)
MONOCYTES NFR BLD: 11 % (ref 5–13)
NEUTS SEG # BLD: 5.7 K/UL (ref 1.8–8)
NEUTS SEG NFR BLD: 63 % (ref 32–75)
NRBC # BLD: 0 K/UL (ref 0–0.01)
NRBC BLD-RTO: 0 PER 100 WBC
PLATELET # BLD AUTO: 269 K/UL (ref 150–400)
PMV BLD AUTO: 10.2 FL (ref 8.9–12.9)
POTASSIUM SERPL-SCNC: 3.2 MMOL/L (ref 3.5–5.1)
RBC # BLD AUTO: 3.33 M/UL (ref 3.8–5.2)
SODIUM SERPL-SCNC: 139 MMOL/L (ref 136–145)
TRIGL SERPL-MCNC: 228 MG/DL
VLDLC SERPL CALC-MCNC: 45.6 MG/DL
WBC # BLD AUTO: 9 K/UL (ref 3.6–11)

## 2023-08-27 PROCEDURE — 6360000002 HC RX W HCPCS: Performed by: INTERNAL MEDICINE

## 2023-08-27 PROCEDURE — 2580000003 HC RX 258: Performed by: INTERNAL MEDICINE

## 2023-08-27 PROCEDURE — 80061 LIPID PANEL: CPT

## 2023-08-27 PROCEDURE — 85025 COMPLETE CBC W/AUTO DIFF WBC: CPT

## 2023-08-27 PROCEDURE — 6370000000 HC RX 637 (ALT 250 FOR IP): Performed by: INTERNAL MEDICINE

## 2023-08-27 PROCEDURE — 80048 BASIC METABOLIC PNL TOTAL CA: CPT

## 2023-08-27 PROCEDURE — 36415 COLL VENOUS BLD VENIPUNCTURE: CPT

## 2023-08-27 PROCEDURE — 1100000000 HC RM PRIVATE

## 2023-08-27 RX ORDER — SODIUM CHLORIDE, SODIUM LACTATE, POTASSIUM CHLORIDE, CALCIUM CHLORIDE 600; 310; 30; 20 MG/100ML; MG/100ML; MG/100ML; MG/100ML
INJECTION, SOLUTION INTRAVENOUS CONTINUOUS
Status: DISPENSED | OUTPATIENT
Start: 2023-08-27 | End: 2023-08-27

## 2023-08-27 RX ORDER — NORTRIPTYLINE HYDROCHLORIDE 25 MG/1
25 CAPSULE ORAL NIGHTLY
Status: DISCONTINUED | OUTPATIENT
Start: 2023-08-27 | End: 2023-08-29 | Stop reason: HOSPADM

## 2023-08-27 RX ADMIN — TRAZODONE HYDROCHLORIDE 50 MG: 50 TABLET ORAL at 00:27

## 2023-08-27 RX ADMIN — GABAPENTIN 400 MG: 400 CAPSULE ORAL at 08:47

## 2023-08-27 RX ADMIN — HYDRALAZINE HYDROCHLORIDE 50 MG: 50 TABLET, FILM COATED ORAL at 05:39

## 2023-08-27 RX ADMIN — DICLOFENAC SODIUM 4 G: 10 GEL TOPICAL at 09:22

## 2023-08-27 RX ADMIN — NORTRIPTYLINE HYDROCHLORIDE 25 MG: 25 CAPSULE ORAL at 20:36

## 2023-08-27 RX ADMIN — ACETAMINOPHEN 650 MG: 325 TABLET ORAL at 23:49

## 2023-08-27 RX ADMIN — ESCITALOPRAM OXALATE 5 MG: 10 TABLET ORAL at 08:47

## 2023-08-27 RX ADMIN — ESTRADIOL 0.5 MG: 0.5 TABLET ORAL at 17:17

## 2023-08-27 RX ADMIN — OXYCODONE HYDROCHLORIDE 10 MG: 10 TABLET ORAL at 20:35

## 2023-08-27 RX ADMIN — DICLOFENAC SODIUM 4 G: 10 GEL TOPICAL at 20:37

## 2023-08-27 RX ADMIN — GABAPENTIN 400 MG: 400 CAPSULE ORAL at 14:30

## 2023-08-27 RX ADMIN — SODIUM CHLORIDE, PRESERVATIVE FREE 10 ML: 5 INJECTION INTRAVENOUS at 00:27

## 2023-08-27 RX ADMIN — ACETAMINOPHEN 650 MG: 325 TABLET ORAL at 02:30

## 2023-08-27 RX ADMIN — METOPROLOL SUCCINATE 100 MG: 50 TABLET, EXTENDED RELEASE ORAL at 14:29

## 2023-08-27 RX ADMIN — ENOXAPARIN SODIUM 40 MG: 100 INJECTION SUBCUTANEOUS at 08:48

## 2023-08-27 RX ADMIN — OXYCODONE HYDROCHLORIDE 10 MG: 10 TABLET ORAL at 12:29

## 2023-08-27 RX ADMIN — ASPIRIN 81 MG 81 MG: 81 TABLET ORAL at 08:47

## 2023-08-27 RX ADMIN — OXYCODONE HYDROCHLORIDE 10 MG: 10 TABLET ORAL at 05:39

## 2023-08-27 RX ADMIN — SODIUM CHLORIDE, POTASSIUM CHLORIDE, SODIUM LACTATE AND CALCIUM CHLORIDE: 600; 310; 30; 20 INJECTION, SOLUTION INTRAVENOUS at 02:00

## 2023-08-27 RX ADMIN — GABAPENTIN 400 MG: 400 CAPSULE ORAL at 20:36

## 2023-08-27 RX ADMIN — SODIUM CHLORIDE, PRESERVATIVE FREE 10 ML: 5 INJECTION INTRAVENOUS at 20:35

## 2023-08-27 RX ADMIN — ATORVASTATIN CALCIUM 40 MG: 40 TABLET, FILM COATED ORAL at 20:36

## 2023-08-27 RX ADMIN — PANTOPRAZOLE SODIUM 40 MG: 40 TABLET, DELAYED RELEASE ORAL at 05:39

## 2023-08-27 RX ADMIN — Medication 5 MG: at 21:45

## 2023-08-27 RX ADMIN — HYDRALAZINE HYDROCHLORIDE 50 MG: 50 TABLET, FILM COATED ORAL at 20:36

## 2023-08-27 ASSESSMENT — PAIN SCALES - GENERAL
PAINLEVEL_OUTOF10: 8
PAINLEVEL_OUTOF10: 10
PAINLEVEL_OUTOF10: 5
PAINLEVEL_OUTOF10: 7
PAINLEVEL_OUTOF10: 7
PAINLEVEL_OUTOF10: 2
PAINLEVEL_OUTOF10: 0
PAINLEVEL_OUTOF10: 0
PAINLEVEL_OUTOF10: 9
PAINLEVEL_OUTOF10: 4
PAINLEVEL_OUTOF10: 2

## 2023-08-27 ASSESSMENT — PAIN DESCRIPTION - ORIENTATION
ORIENTATION: LOWER
ORIENTATION: POSTERIOR
ORIENTATION: POSTERIOR
ORIENTATION: MID;UPPER
ORIENTATION: POSTERIOR;LOWER

## 2023-08-27 ASSESSMENT — PAIN DESCRIPTION - LOCATION
LOCATION: NECK
LOCATION: BACK;NECK
LOCATION: BACK
LOCATION: NECK
LOCATION: BACK
LOCATION: BACK;NECK

## 2023-08-27 ASSESSMENT — PAIN DESCRIPTION - DESCRIPTORS
DESCRIPTORS: DISCOMFORT;ACHING
DESCRIPTORS: ACHING;THROBBING
DESCRIPTORS: ACHING;DISCOMFORT
DESCRIPTORS: ACHING;DISCOMFORT
DESCRIPTORS: ACHING

## 2023-08-27 ASSESSMENT — PAIN - FUNCTIONAL ASSESSMENT
PAIN_FUNCTIONAL_ASSESSMENT: PREVENTS OR INTERFERES WITH MANY ACTIVE NOT PASSIVE ACTIVITIES
PAIN_FUNCTIONAL_ASSESSMENT: ACTIVITIES ARE NOT PREVENTED

## 2023-08-27 ASSESSMENT — PAIN SCALES - WONG BAKER
WONGBAKER_NUMERICALRESPONSE: 2
WONGBAKER_NUMERICALRESPONSE: 2
WONGBAKER_NUMERICALRESPONSE: 0

## 2023-08-27 NOTE — PROGRESS NOTES
Patient had not passed any urine. Bladder scan done, 471ml noted. Patient ambulated to bathroom with help. Urinated yellow urine. Bladder scan done again after urinating. 228ml noted.  Patient doesn't feel the need to pass urine

## 2023-08-27 NOTE — PROGRESS NOTES
9:12 am Consult was called in to Neuro-tele to Jose. 9:15 am  faxed paper work to [facility].      9:16 am  Nurse set up camera in the room.      10:12 am  SOC report received  and place in chart @ 10:26 am.

## 2023-08-27 NOTE — CARE COORDINATION
08/27/23 1326   Service Assessment   Patient Orientation Place; Alert and Oriented;Person;Self   Cognition Short Term Memory Deficit   History Provided By Patient;Medical Record   Primary Caregiver Family   Support Systems Children;Family Members   PCP Verified by CM Yes  ([facility] )   Last Visit to PCP Within last 3 months   Prior Functional Level Assistance with the following:;Other (see comment)  (pt states cane only DME)   Current Functional Level Assistance with the following:;Mobility; Other (see comment)  (cane)   Can patient return to prior living arrangement Yes   Ability to make needs known: Good   Family able to assist with home care needs: Yes   Would you like for me to discuss the discharge plan with any other family members/significant others, and if so, who? No   Financial Resources [employer] None   Social/Functional History   Bathroom Equipment None   Home Equipment [place]   Receives Help From Family  Kym Audelia)     [address] [facility] with Home Health follow up. CM to follow for identification of any needs. The pt stated her son Leah Catching or daughter in law Lyly Riggins will transport at [place].   Phone numbers not available at this writing

## 2023-08-28 ENCOUNTER — APPOINTMENT (OUTPATIENT)
Facility: HOSPITAL | Age: 78
DRG: 884 | End: 2023-08-28
Attending: INTERNAL MEDICINE
Payer: MEDICARE

## 2023-08-28 ENCOUNTER — APPOINTMENT (OUTPATIENT)
Facility: HOSPITAL | Age: 78
DRG: 884 | End: 2023-08-28
Payer: MEDICARE

## 2023-08-28 LAB
ECHO AO ASC DIAM: 3.1 CM
ECHO AO ASCENDING AORTA INDEX: 1.86 CM/M2
ECHO AO ROOT DIAM: 3 CM
ECHO AO ROOT INDEX: 1.8 CM/M2
ECHO AV AREA PEAK VELOCITY: 2.1 CM2
ECHO AV AREA VTI: 2.1 CM2
ECHO AV AREA/BSA PEAK VELOCITY: 1.3 CM2/M2
ECHO AV AREA/BSA VTI: 1.3 CM2/M2
ECHO AV MEAN GRADIENT: 7 MMHG
ECHO AV MEAN VELOCITY: 1.2 M/S
ECHO AV PEAK GRADIENT: 13 MMHG
ECHO AV PEAK VELOCITY: 1.8 M/S
ECHO AV VELOCITY RATIO: 0.67
ECHO AV VTI: 38.4 CM
ECHO BSA: 1.69 M2
ECHO IVC EXP: 2 CM
ECHO IVC INSP: 1 CM
ECHO LA AREA 2C: 23.4 CM2
ECHO LA AREA 4C: 21.8 CM2
ECHO LA DIAMETER INDEX: 2.46 CM/M2
ECHO LA DIAMETER: 4.1 CM
ECHO LA MAJOR AXIS: 5.8 CM
ECHO LA MINOR AXIS: 5.6 CM
ECHO LA TO AORTIC ROOT RATIO: 1.37
ECHO LA VOL 2C: 73 ML (ref 22–52)
ECHO LA VOL 4C: 65 ML (ref 22–52)
ECHO LA VOL BP: 70 ML (ref 22–52)
ECHO LA VOL/BSA BIPLANE: 42 ML/M2 (ref 16–34)
ECHO LA VOLUME INDEX A2C: 44 ML/M2 (ref 16–34)
ECHO LA VOLUME INDEX A4C: 39 ML/M2 (ref 16–34)
ECHO LV E' LATERAL VELOCITY: 10 CM/S
ECHO LV E' SEPTAL VELOCITY: 8 CM/S
ECHO LV EDV A2C: 73 ML
ECHO LV EDV A4C: 88 ML
ECHO LV EDV INDEX A4C: 53 ML/M2
ECHO LV EDV NDEX A2C: 44 ML/M2
ECHO LV EJECTION FRACTION A2C: 64 %
ECHO LV EJECTION FRACTION A4C: 53 %
ECHO LV EJECTION FRACTION BIPLANE: 57 % (ref 55–100)
ECHO LV ESV A2C: 27 ML
ECHO LV ESV A4C: 41 ML
ECHO LV ESV INDEX A2C: 16 ML/M2
ECHO LV ESV INDEX A4C: 25 ML/M2
ECHO LV FRACTIONAL SHORTENING: 31 % (ref 28–44)
ECHO LV INTERNAL DIMENSION DIASTOLE INDEX: 2.69 CM/M2
ECHO LV INTERNAL DIMENSION DIASTOLIC: 4.5 CM (ref 3.9–5.3)
ECHO LV INTERNAL DIMENSION SYSTOLIC INDEX: 1.86 CM/M2
ECHO LV INTERNAL DIMENSION SYSTOLIC: 3.1 CM
ECHO LV IVSD: 1.1 CM (ref 0.6–0.9)
ECHO LV MASS 2D: 153.3 G (ref 67–162)
ECHO LV MASS INDEX 2D: 91.8 G/M2 (ref 43–95)
ECHO LV POSTERIOR WALL DIASTOLIC: 0.9 CM (ref 0.6–0.9)
ECHO LV RELATIVE WALL THICKNESS RATIO: 0.4
ECHO LVOT AREA: 3.1 CM2
ECHO LVOT AV VTI INDEX: 0.66
ECHO LVOT DIAM: 2 CM
ECHO LVOT MEAN GRADIENT: 3 MMHG
ECHO LVOT PEAK GRADIENT: 6 MMHG
ECHO LVOT PEAK VELOCITY: 1.2 M/S
ECHO LVOT STROKE VOLUME INDEX: 47.8 ML/M2
ECHO LVOT SV: 79.8 ML
ECHO LVOT VTI: 25.4 CM
ECHO MV A VELOCITY: 1.17 M/S
ECHO MV AREA VTI: 2.3 CM2
ECHO MV E DECELERATION TIME (DT): 275 MS
ECHO MV E VELOCITY: 1.11 M/S
ECHO MV E/A RATIO: 0.95
ECHO MV E/E' LATERAL: 11.1
ECHO MV E/E' RATIO (AVERAGED): 12.49
ECHO MV E/E' SEPTAL: 13.88
ECHO MV LVOT VTI INDEX: 1.37
ECHO MV MAX VELOCITY: 1.3 M/S
ECHO MV MEAN GRADIENT: 3 MMHG
ECHO MV MEAN VELOCITY: 0.9 M/S
ECHO MV PEAK GRADIENT: 6 MMHG
ECHO MV VTI: 34.8 CM
ECHO PULMONARY ARTERY END DIASTOLIC PRESSURE: 12 MMHG
ECHO PV MAX VELOCITY: 0.7 M/S
ECHO PV PEAK GRADIENT: 2 MMHG
ECHO PV REGURGITANT MAX VELOCITY: 1.8 M/S
ECHO RA AREA 4C: 14 CM2
ECHO RA END SYSTOLIC VOLUME APICAL 4 CHAMBER INDEX BSA: 22 ML/M2
ECHO RA VOLUME: 36 ML
ECHO RV BASAL DIMENSION: 3.9 CM
ECHO RV FREE WALL PEAK S': 10 CM/S
ECHO RV TAPSE: 1.8 CM (ref 1.7–?)
ECHO TV REGURGITANT MAX VELOCITY: 3.16 M/S
ECHO TV REGURGITANT PEAK GRADIENT: 40 MMHG
POTASSIUM SERPL-SCNC: 3.5 MMOL/L (ref 3.5–5.1)

## 2023-08-28 PROCEDURE — 97161 PT EVAL LOW COMPLEX 20 MIN: CPT

## 2023-08-28 PROCEDURE — 84132 ASSAY OF SERUM POTASSIUM: CPT

## 2023-08-28 PROCEDURE — 2580000003 HC RX 258: Performed by: INTERNAL MEDICINE

## 2023-08-28 PROCEDURE — 36415 COLL VENOUS BLD VENIPUNCTURE: CPT

## 2023-08-28 PROCEDURE — 1100000000 HC RM PRIVATE

## 2023-08-28 PROCEDURE — 70551 MRI BRAIN STEM W/O DYE: CPT

## 2023-08-28 PROCEDURE — 93306 TTE W/DOPPLER COMPLETE: CPT

## 2023-08-28 PROCEDURE — 6360000002 HC RX W HCPCS: Performed by: INTERNAL MEDICINE

## 2023-08-28 PROCEDURE — 6370000000 HC RX 637 (ALT 250 FOR IP): Performed by: INTERNAL MEDICINE

## 2023-08-28 PROCEDURE — 97530 THERAPEUTIC ACTIVITIES: CPT

## 2023-08-28 PROCEDURE — 97165 OT EVAL LOW COMPLEX 30 MIN: CPT

## 2023-08-28 RX ORDER — POTASSIUM CHLORIDE 20 MEQ/1
40 TABLET, EXTENDED RELEASE ORAL ONCE
Status: COMPLETED | OUTPATIENT
Start: 2023-08-28 | End: 2023-08-28

## 2023-08-28 RX ADMIN — ESCITALOPRAM OXALATE 5 MG: 10 TABLET ORAL at 09:40

## 2023-08-28 RX ADMIN — ESTRADIOL 0.5 MG: 0.5 TABLET ORAL at 09:40

## 2023-08-28 RX ADMIN — OXYCODONE HYDROCHLORIDE 10 MG: 10 TABLET ORAL at 09:39

## 2023-08-28 RX ADMIN — DICLOFENAC SODIUM 4 G: 10 GEL TOPICAL at 09:42

## 2023-08-28 RX ADMIN — PANTOPRAZOLE SODIUM 40 MG: 40 TABLET, DELAYED RELEASE ORAL at 06:24

## 2023-08-28 RX ADMIN — POTASSIUM CHLORIDE 40 MEQ: 1500 TABLET, EXTENDED RELEASE ORAL at 06:24

## 2023-08-28 RX ADMIN — ASPIRIN 81 MG 81 MG: 81 TABLET ORAL at 09:40

## 2023-08-28 RX ADMIN — GABAPENTIN 400 MG: 400 CAPSULE ORAL at 14:31

## 2023-08-28 RX ADMIN — HYDRALAZINE HYDROCHLORIDE 50 MG: 50 TABLET, FILM COATED ORAL at 06:24

## 2023-08-28 RX ADMIN — SODIUM CHLORIDE, PRESERVATIVE FREE 10 ML: 5 INJECTION INTRAVENOUS at 22:05

## 2023-08-28 RX ADMIN — SODIUM CHLORIDE, PRESERVATIVE FREE 10 ML: 5 INJECTION INTRAVENOUS at 09:40

## 2023-08-28 RX ADMIN — HYDRALAZINE HYDROCHLORIDE 50 MG: 50 TABLET, FILM COATED ORAL at 22:03

## 2023-08-28 RX ADMIN — METOPROLOL SUCCINATE 100 MG: 50 TABLET, EXTENDED RELEASE ORAL at 09:39

## 2023-08-28 RX ADMIN — GABAPENTIN 400 MG: 400 CAPSULE ORAL at 09:39

## 2023-08-28 RX ADMIN — GABAPENTIN 400 MG: 400 CAPSULE ORAL at 22:03

## 2023-08-28 RX ADMIN — ATORVASTATIN CALCIUM 40 MG: 40 TABLET, FILM COATED ORAL at 22:03

## 2023-08-28 RX ADMIN — ENOXAPARIN SODIUM 40 MG: 100 INJECTION SUBCUTANEOUS at 09:38

## 2023-08-28 RX ADMIN — HYDRALAZINE HYDROCHLORIDE 50 MG: 50 TABLET, FILM COATED ORAL at 14:31

## 2023-08-28 RX ADMIN — OXYCODONE HYDROCHLORIDE 10 MG: 10 TABLET ORAL at 22:03

## 2023-08-28 RX ADMIN — DICLOFENAC SODIUM 4 G: 10 GEL TOPICAL at 22:04

## 2023-08-28 RX ADMIN — OXYCODONE HYDROCHLORIDE 10 MG: 10 TABLET ORAL at 16:24

## 2023-08-28 RX ADMIN — NORTRIPTYLINE HYDROCHLORIDE 25 MG: 25 CAPSULE ORAL at 22:03

## 2023-08-28 RX ADMIN — Medication 5 MG: at 22:07

## 2023-08-28 ASSESSMENT — PAIN SCALES - GENERAL
PAINLEVEL_OUTOF10: 2
PAINLEVEL_OUTOF10: 8
PAINLEVEL_OUTOF10: 10
PAINLEVEL_OUTOF10: 1
PAINLEVEL_OUTOF10: 9
PAINLEVEL_OUTOF10: 5
PAINLEVEL_OUTOF10: 5
PAINLEVEL_OUTOF10: 3
PAINLEVEL_OUTOF10: 8

## 2023-08-28 ASSESSMENT — PAIN DESCRIPTION - ORIENTATION
ORIENTATION: LEFT;RIGHT;LOWER
ORIENTATION: LEFT;RIGHT
ORIENTATION: RIGHT;LEFT
ORIENTATION: RIGHT;LEFT

## 2023-08-28 ASSESSMENT — PAIN - FUNCTIONAL ASSESSMENT: PAIN_FUNCTIONAL_ASSESSMENT: ACTIVITIES ARE NOT PREVENTED

## 2023-08-28 ASSESSMENT — PAIN DESCRIPTION - DESCRIPTORS
DESCRIPTORS: ACHING;DISCOMFORT
DESCRIPTORS: ACHING

## 2023-08-28 ASSESSMENT — PAIN DESCRIPTION - LOCATION
LOCATION: KNEE
LOCATION: BACK;LEG
LOCATION: KNEE;BACK
LOCATION: BACK;KNEE;LEG

## 2023-08-28 ASSESSMENT — PAIN SCALES - WONG BAKER
WONGBAKER_NUMERICALRESPONSE: 0
WONGBAKER_NUMERICALRESPONSE: 0

## 2023-08-28 NOTE — CARE COORDINATION
CM reviewed Pt medicals, Pt FARNAZI-L came up tot he desk to talk with CM. Pt PATEL-I-L stated that Pt son, and their daughter have lives with Pt for the past three years and was literally getting ready to move out because they go their house built. Pt PATEL-I-L stated that they lived with Pt in [address] [place] [address] and they are moving to [place]. CM discussed D/C planning with Pt PATEL-I-L. Pt PATEL-I-L asked if CM could send a referral to [address] [place] [address] and Rehab. Pt PATEL-I-L stated that Pt has the finances to pay for Assisted Living should she need to go that route. Pt PATEL-I-L signed that choice letter for [facility], will send the referral, will place choice letter on Pt chart. 2:30  [address] [facility] [facility] has accepted Pt.

## 2023-08-28 NOTE — PROGRESS NOTES
HOSPITALIST PROGRESS NOTE  Dimitris Ascencio MD, [address]         Daily Progress Note: 8/28/2023      Subjective:     Patient is alert and oriented x 4 today and feels much better. Improved bilateral lower extremity cramping and pain after starting Pamelor. No overnight fever/chills, chest pain, shortness of breath or dizziness noted. Status post MRI of the brain and reviewed results which are essentially negative for acute CVA. Patient now endorsing history of falls with or without syncope and therefore pending echocardiogram and orthostatics. Reviewed POC with case management as well as patient's MPOA/daughter-in-law extensively. Patient will require SNF due to significant debility and further rehab required per PT evaluation. Patient excepted by [facility] and will likely have bed available on 8/29/2023. Echocardiogram pending.       Medications reviewed  Current Facility-Administered Medications   Medication Dose Route Frequency    nortriptyline (PAMELOR) capsule 25 mg  25 mg Oral Nightly    sodium chloride flush 0.9 % injection 5-40 mL  5-40 mL IntraVENous 2 times per day    sodium chloride flush 0.9 % injection 5-40 mL  5-40 mL IntraVENous PRN    0.9 % sodium chloride infusion   IntraVENous PRN    enoxaparin (LOVENOX) injection 40 mg  40 mg SubCUTAneous Daily    ondansetron (ZOFRAN-ODT) disintegrating tablet 4 mg  4 mg Oral Q8H PRN    Or    ondansetron (ZOFRAN) injection 4 mg  4 mg IntraVENous Q6H PRN    polyethylene glycol (GLYCOLAX) packet 17 g  17 g Oral Daily PRN    acetaminophen (TYLENOL) tablet 650 mg  650 mg Oral Q6H PRN    Or    acetaminophen (TYLENOL) suppository 650 mg  650 mg Rectal Q6H PRN    diclofenac sodium (VOLTAREN) 1 % gel 4 g  4 g Topical BID    escitalopram (LEXAPRO) tablet 5 mg  5 mg Oral Daily    estradiol (ESTRACE) tablet 0.5 mg  0.5 mg Oral Daily    hydrALAZINE (APRESOLINE) WBC 7.7 9.0   HGB 12.6 10.3*   HCT 37.3 29.9*    269     Recent Labs     08/26/23  1609 08/27/23  0602 08/28/23  0744    139  --    K 3.5 3.2* 3.5    108  --    CO2 27 23  --    BUN 27* 23*  --    ALT 33  --   --      No results for input(s): PH, PCO2, PO2, HCO3, FIO2 in the last 72 hours. 24 Hour Results:  Recent Results (from the past 24 hour(s))   Potassium    Collection Time: 08/28/23  7:44 AM   Result Value Ref Range    Potassium 3.5 3.5 - 5.1 mmol/L       RADIOLOGY:  CT HEAD WO CONTRAST    Result Date: 8/26/2023  EXAM: CT HEAD WO CONTRAST INDICATION: AMS, assess for CVA COMPARISON: CT head September 5, 2021. CONTRAST: None. TECHNIQUE: Unenhanced CT of the head was performed using 5 mm images. Brain and bone windows were generated. Coronal and sagittal reformats. CT dose reduction was achieved through use of a standardized protocol tailored for this examination and automatic exposure control for dose modulation. FINDINGS: The ventricles and sulci are normal in size, shape and configuration. There is mild periventricular and subcortical white matter hypodensity consistent with chronic small vessel ischemic disease. . There is no intracranial hemorrhage, extra-axial collection, or mass effect. The basilar cisterns are open. No CT evidence of acute infarct. The bone windows demonstrate no abnormalities. The visualized portions of the paranasal sinuses and mastoid air cells are clear. No evidence of acute intracranial abnormality. There is mild periventricular and subcortical white matter hypodensity consistent with chronic small vessel ischemic disease. XR CHEST PORTABLE    Result Date: 8/26/2023  EXAM:  XR CHEST PORTABLE INDICATION: Assess for airspace disease COMPARISON: 9/5/2021 TECHNIQUE: portable chest AP view FINDINGS: The cardiac silhouette is within normal limits. The pulmonary vasculature is within normal limits. The lungs and pleural spaces are clear.  Degenerative changes

## 2023-08-29 VITALS
RESPIRATION RATE: 20 BRPM | WEIGHT: 142 LBS | TEMPERATURE: 97.7 F | HEIGHT: 63 IN | SYSTOLIC BLOOD PRESSURE: 153 MMHG | HEART RATE: 79 BPM | BODY MASS INDEX: 25.16 KG/M2 | OXYGEN SATURATION: 93 % | DIASTOLIC BLOOD PRESSURE: 72 MMHG

## 2023-08-29 PROCEDURE — 6360000002 HC RX W HCPCS: Performed by: INTERNAL MEDICINE

## 2023-08-29 PROCEDURE — 6370000000 HC RX 637 (ALT 250 FOR IP): Performed by: INTERNAL MEDICINE

## 2023-08-29 PROCEDURE — 97530 THERAPEUTIC ACTIVITIES: CPT

## 2023-08-29 PROCEDURE — 2580000003 HC RX 258: Performed by: INTERNAL MEDICINE

## 2023-08-29 RX ORDER — ASPIRIN 81 MG/1
81 TABLET, CHEWABLE ORAL DAILY
Qty: 30 TABLET | Refills: 3 | Status: SHIPPED | OUTPATIENT
Start: 2023-08-29

## 2023-08-29 RX ORDER — ESCITALOPRAM OXALATE 5 MG/1
5 TABLET ORAL DAILY
Qty: 30 TABLET | Refills: 3 | Status: SHIPPED | OUTPATIENT
Start: 2023-08-29

## 2023-08-29 RX ORDER — ATORVASTATIN CALCIUM 40 MG/1
40 TABLET, FILM COATED ORAL NIGHTLY
Qty: 30 TABLET | Refills: 3 | Status: SHIPPED | OUTPATIENT
Start: 2023-08-29

## 2023-08-29 RX ADMIN — ENOXAPARIN SODIUM 40 MG: 100 INJECTION SUBCUTANEOUS at 10:39

## 2023-08-29 RX ADMIN — PANTOPRAZOLE SODIUM 40 MG: 40 TABLET, DELAYED RELEASE ORAL at 05:34

## 2023-08-29 RX ADMIN — POLYETHYLENE GLYCOL 3350 17 G: 17 POWDER, FOR SOLUTION ORAL at 10:39

## 2023-08-29 RX ADMIN — HYDRALAZINE HYDROCHLORIDE 50 MG: 50 TABLET, FILM COATED ORAL at 13:43

## 2023-08-29 RX ADMIN — ASPIRIN 81 MG 81 MG: 81 TABLET ORAL at 10:39

## 2023-08-29 RX ADMIN — OXYCODONE HYDROCHLORIDE 10 MG: 10 TABLET ORAL at 03:48

## 2023-08-29 RX ADMIN — SODIUM CHLORIDE, PRESERVATIVE FREE 10 ML: 5 INJECTION INTRAVENOUS at 10:39

## 2023-08-29 RX ADMIN — HYDRALAZINE HYDROCHLORIDE 50 MG: 50 TABLET, FILM COATED ORAL at 05:34

## 2023-08-29 RX ADMIN — ESTRADIOL 0.5 MG: 0.5 TABLET ORAL at 10:39

## 2023-08-29 RX ADMIN — GABAPENTIN 400 MG: 400 CAPSULE ORAL at 13:43

## 2023-08-29 RX ADMIN — METOPROLOL SUCCINATE 100 MG: 50 TABLET, EXTENDED RELEASE ORAL at 10:39

## 2023-08-29 RX ADMIN — DICLOFENAC SODIUM 4 G: 10 GEL TOPICAL at 10:41

## 2023-08-29 RX ADMIN — TRAZODONE HYDROCHLORIDE 50 MG: 50 TABLET ORAL at 00:24

## 2023-08-29 RX ADMIN — ESCITALOPRAM OXALATE 5 MG: 10 TABLET ORAL at 10:39

## 2023-08-29 RX ADMIN — OXYCODONE HYDROCHLORIDE 10 MG: 10 TABLET ORAL at 13:43

## 2023-08-29 RX ADMIN — GABAPENTIN 400 MG: 400 CAPSULE ORAL at 10:40

## 2023-08-29 ASSESSMENT — PAIN - FUNCTIONAL ASSESSMENT: PAIN_FUNCTIONAL_ASSESSMENT: ACTIVITIES ARE NOT PREVENTED

## 2023-08-29 ASSESSMENT — PAIN DESCRIPTION - LOCATION
LOCATION: KNEE;BACK
LOCATION: BACK

## 2023-08-29 ASSESSMENT — PAIN DESCRIPTION - DESCRIPTORS
DESCRIPTORS: ACHING
DESCRIPTORS: ACHING;TIGHTNESS;NAGGING

## 2023-08-29 ASSESSMENT — PAIN SCALES - GENERAL
PAINLEVEL_OUTOF10: 9
PAINLEVEL_OUTOF10: 8
PAINLEVEL_OUTOF10: 3

## 2023-08-29 ASSESSMENT — PAIN DESCRIPTION - ORIENTATION
ORIENTATION: LOWER;MID
ORIENTATION: RIGHT;LEFT;LOWER

## 2023-08-29 NOTE — DISCHARGE SUMMARY
HOSPITALIST DISCHARGE NOTE  Javier Mares MD, [address] [facility], [facility] ID: Sal Mejia  MRN: [id number]   YOB: 1945    DATE OF ADMISSION: 8/26/2023  3:35 PM    DATE OF DISCHARGE: 08/29/23    PRIMARY CARE PROVIDER: CEDRIC BaxterC     ATTENDING PHYSICIAN: Javier Mares MD  DISCHARGING PROVIDER: Javier Mares MD        CONSULTATIONS: IP CONSULT TO TELE-NEUROLOGY    PROCEDURES/SURGERIES: * No surgery found *    [address] COURSE:   Sal Mejia is a 70 y.o. female with PMH of hypertension, and other medical problems as below. Presented to the ED with chief complaint of altered mental status. Family at bedside reports yesterday noticed that she was confused when driving home and got lost. Family denies any new focal neurology symptoms. However, has been having intermittent dizziness episodes in the past few months. Blurry vision at baseline due to cataract. No slurred speech but kept on forgetting words. Patient is agitated as she cannot  remember what she wants to do or say. Patient does takes oxycodone at home but denies taking more than usual dose. In the ED, noted hypertensive. Lab significant for GLORY. UA positive for ketone but not indicative of UTI.     8/29/23  Patient is alert and oriented x 4 today and feels much better. Improved bilateral lower extremity cramping and pain after starting Pamelor. No overnight fever/chills, chest pain, shortness of breath or dizziness noted. Status post MRI of the brain and reviewed results which are essentially Normal and negative for acute CVA. Patient endorsing history of falls with or without syncope and therefore pending echocardiogram and orthostatics. Reviewed POC with case management as well as patient's MPOA/daughter-in-law extensively.   Patient will require SNF due to significant debility and further rehab 0. 66     GALA/BSA VTI 1.3 cm2/m2    GALA/BSA Peak Velocity 1.3 cm2/m2    MV:LVOT VTI Index 1.37         MRI BRAIN WO CONTRAST    Result Date: 8/28/2023  EXAM: MRI BRAIN WO CONTRAST INDICATION: 61-year-old female with suspected CVA. COMPARISON: 8/26/2023 CT. CONTRAST: None. TECHNIQUE:  Multiplanar multisequence acquisition without contrast of the brain. FINDINGS: The ventricles are normal in size and position, allowing for mild diffuse parenchymal volume loss. Mild patchy periventricular and subcortical white matter T2/FLAIR hyperintensity, nonspecific and likely microangiopathic ischemic changes. There is no acute infarct, hemorrhage, extra-axial fluid collection, or mass effect. There is no cerebellar tonsillar herniation. Expected arterial flow-voids are present. Mild diffuse mucosal thickening of the paranasal sinuses. The mastoids are free of fluid bilaterally. The orbital contents are within normal limits. No significant osseous or scalp lesions are identified. No acute infarct or intracranial hemorrhage. Mild chronic microangiopathic white matter disease. Transthoracic echocardiogram (TTE) complete with contrast, bubble, strain, and 3D PRN    Result Date: 8/28/2023    Left Ventricle: Normal left ventricular systolic function with a visually estimated EF of 60 - 65%. Left ventricle size is normal. Septal thickening. Normal wall motion. Indeterminate diastolic function. Aortic Valve: Trileaflet valve. Thickened cusp. Mild regurgitation. Mitral Valve: Mild to moderate regurgitation. Left Atrium: Left atrium is dilated. NOTIFY YOUR PHYSICIAN FOR ANY OF THE FOLLOWING:   Fever over 101 degrees for 24 hours. Chest pain, shortness of breath, fever, chills, nausea, vomiting, diarrhea, change in mentation, falling, weakness, bleeding. Severe pain or pain not relieved by medications. Or, any other signs or symptoms that you may have questions about.     DISPOSITION:    Home With:   OT  PT  [facility]  RN x Long term SNF/Inpatient Rehab    Independent/assisted living    Hospice    Other:       PATIENT CONDITION AT DISCHARGE:     Functional status    Poor    x Deconditioned     Independent      Cognition   x Lucid     Forgetful     Dementia      Catheters/lines (plus indication)    [facility]     PICC     PEG    x None      Code status    x Full code     DNR      PHYSICAL EXAMINATION AT DISCHARGE:  General:          Alert, cooperative, no distress, appears stated age. HEENT:           Atraumatic, anicteric sclerae, pink conjunctivae                          No oral ulcers, mucosa moist, throat clear, dentition fair  Neck:               Supple, symmetrical  Lungs:             Clear to auscultation bilaterally. No Wheezing or Rhonchi. No rales. Chest wall:      No tenderness  No Accessory muscle use. Heart:              Regular  rhythm,  No  murmur   No edema  Abdomen:        Soft, non-tender. Not distended. Bowel sounds normal  Extremities:     No cyanosis. No clubbing,                            Skin turgor normal, Capillary refill normal  Skin:                Not pale. Not Jaundiced  No rashes   Psych:             Not anxious or agitated.   Neurologic:      Alert, moves all extremities, answers questions appropriately and responds to commands       CHRONIC MEDICAL DIAGNOSES:      Greater than 35 minutes were spent with the patient on counseling and coordination of care    Signed:   Javier Mares MD  8/29/2023  10:38 AM

## 2023-08-29 NOTE — PLAN OF CARE
PHYSICAL THERAPY TREATMENT     Patient: Gloria Varner (89 y.o. female)  Date: 8/29/2023  Diagnosis: Altered mental status [R41.82]  GLORY (acute kidney injury) ([address]) [N17.9]  Altered mental status, unspecified altered mental status type [R41.82] Altered mental status      Precautions: Fall Risk, General Precautions                In place during session: EKG/telemetry   Chart, physical therapy assessment, plan of care and goals were reviewed. ASSESSMENT  Patient continues with skilled PT services and is progressing towards goals. Pt up in room ambulating with RN upon PT arrival, agreeable to session. Pt ambulated into the hallway with PTA and back to the room ~80 ft before sitting down in high back chair. Pt performed sitting therex in chair, see chart below. Gait was slow and steady with no LOB noted. Pt is limited by activity tolerance, impaired functional mobility, and strength. Pt left sitting in chair with family present . (See below for objective details and assist levels). Overall pt tolerated session fair today with increased gait distance. Will continue to benefit from skilled PT services, and will continue to progress as tolerated. Current Level of Function Impacting Discharge (mobility/balance): SBA/CGA    Other factors to consider for discharge: high risk for falls, not safe to be alone, and concern for safely navigating or managing the home environment       PLAN :  Patient continues to benefit from skilled intervention to address impaired functional mobility, impaired strength, and decreased activity tolerance. Continue treatment per established plan of care to address goals.     Recommend with staff: Out of bed to chair for meals, Encourage HEP in prep for ADLs/mobility; see handout for details, AD and gt belt for bed to chair , Amb to bathroom with AD and gait belt, and Assist x1    Recommendation for discharge: (in order for the patient to meet his/her long term goals)  Home with Home Care;Home Exercise Program;Energy Conservation;Transfer Training; Fall Prevention Strategies  Education Method: Demonstration;Verbal;Teach Back  Education Outcome: Verbalized understanding;Demonstrated understanding;Continued education needed    Ashley Rollins PTA  Minutes: 29    Problem: Physical Therapy - Adult  Goal: By Discharge: Performs mobility at highest level of function for planned discharge setting. See evaluation for individualized goals.   Outcome: Progressing

## 2023-08-29 NOTE — PROGRESS NOTES
Report called to Maria Esther at [address] [place] [address] and Rehab. Informed her patients family would be bringing patient.

## 2023-08-29 NOTE — CARE COORDINATION
DC Plan: [facility]        Room #100       Report: [phone]       Transport: family    Cm received room# from [facility]. Pt, pt's son - Gonzalez Whyte and daughter-in-law - Ciara Aceves is aware of discharge today to [facility]. Transportation was discussed. Family will provide transport. Transition of Care Plan:    RUR: 11%  Prior Level of Functioning: needs assistance  Disposition: SNF  If SNF or IPR: Date FOC offered: 8/28  Date [address] received: 8/28  Accepting facility: [facility]  Date authorization started with reference number: N/A  Date authorization received and expires: N/A  Follow up appointments: Yes  DME needed: None  Transportation at discharge: family  IM/IMM Medicare/ letter given: Yes  Is patient a  and connected with [facility]? If yes, was [facility] transfer form completed and [facility] notified? Caregiver Contact: Family  Discharge Caregiver contacted prior to discharge? Yes  Care Conference needed?  No  Barriers to discharge: None

## 2023-10-16 ENCOUNTER — OFFICE VISIT (OUTPATIENT)
Age: 78
End: 2023-10-16
Payer: MEDICARE

## 2023-10-16 VITALS
DIASTOLIC BLOOD PRESSURE: 94 MMHG | OXYGEN SATURATION: 99 % | WEIGHT: 142 LBS | HEART RATE: 98 BPM | BODY MASS INDEX: 25.16 KG/M2 | SYSTOLIC BLOOD PRESSURE: 157 MMHG | HEIGHT: 63 IN | RESPIRATION RATE: 20 BRPM

## 2023-10-16 DIAGNOSIS — B35.1 ONYCHOMYCOSIS: ICD-10-CM

## 2023-10-16 DIAGNOSIS — M21.619 BUNION OF GREAT TOE: Primary | ICD-10-CM

## 2023-10-16 PROCEDURE — G8419 CALC BMI OUT NRM PARAM NOF/U: HCPCS | Performed by: PODIATRIST

## 2023-10-16 PROCEDURE — 1036F TOBACCO NON-USER: CPT | Performed by: PODIATRIST

## 2023-10-16 PROCEDURE — 1090F PRES/ABSN URINE INCON ASSESS: CPT | Performed by: PODIATRIST

## 2023-10-16 PROCEDURE — 99204 OFFICE O/P NEW MOD 45 MIN: CPT | Performed by: PODIATRIST

## 2023-10-16 PROCEDURE — G8400 PT W/DXA NO RESULTS DOC: HCPCS | Performed by: PODIATRIST

## 2023-10-16 PROCEDURE — 1123F ACP DISCUSS/DSCN MKR DOCD: CPT | Performed by: PODIATRIST

## 2023-10-16 PROCEDURE — G8484 FLU IMMUNIZE NO ADMIN: HCPCS | Performed by: PODIATRIST

## 2023-10-16 PROCEDURE — G8427 DOCREV CUR MEDS BY ELIG CLIN: HCPCS | Performed by: PODIATRIST

## 2023-10-16 RX ORDER — CLOTRIMAZOLE 1 %
CREAM (GRAM) TOPICAL
Qty: 45 G | Refills: 1 | Status: SHIPPED | OUTPATIENT
Start: 2023-10-16 | End: 2023-10-23

## 2023-10-16 NOTE — PROGRESS NOTES
Blood pressure (!) 157/94, pulse 98, resp. rate 20, height 5' 3\" (1.6 m), weight 142 lb (64.4 kg), SpO2 99 %.

## 2023-10-22 ASSESSMENT — ENCOUNTER SYMPTOMS
SHORTNESS OF BREATH: 0
ABDOMINAL PAIN: 0
VOMITING: 0
DIARRHEA: 0

## 2023-10-24 ENCOUNTER — HOSPITAL ENCOUNTER (OUTPATIENT)
Facility: HOSPITAL | Age: 78
Discharge: HOME | End: 2023-10-27
Attending: PODIATRIST
Payer: MEDICARE

## 2023-10-24 DIAGNOSIS — M21.619 BUNION OF GREAT TOE: ICD-10-CM

## 2023-10-24 PROCEDURE — 73630 X-RAY EXAM OF FOOT: CPT

## 2023-11-14 ENCOUNTER — OFFICE VISIT (OUTPATIENT)
Age: 78
End: 2023-11-14
Payer: MEDICARE

## 2023-11-14 VITALS
WEIGHT: 142 LBS | DIASTOLIC BLOOD PRESSURE: 78 MMHG | HEIGHT: 63 IN | BODY MASS INDEX: 25.16 KG/M2 | TEMPERATURE: 98.4 F | HEART RATE: 98 BPM | SYSTOLIC BLOOD PRESSURE: 140 MMHG

## 2023-11-14 DIAGNOSIS — M21.619 BUNION OF GREAT TOE: Primary | ICD-10-CM

## 2023-11-14 DIAGNOSIS — M20.41 HAMMERTOE OF RIGHT FOOT: ICD-10-CM

## 2023-11-14 PROCEDURE — G8427 DOCREV CUR MEDS BY ELIG CLIN: HCPCS | Performed by: PODIATRIST

## 2023-11-14 PROCEDURE — G8484 FLU IMMUNIZE NO ADMIN: HCPCS | Performed by: PODIATRIST

## 2023-11-14 PROCEDURE — G8400 PT W/DXA NO RESULTS DOC: HCPCS | Performed by: PODIATRIST

## 2023-11-14 PROCEDURE — 1090F PRES/ABSN URINE INCON ASSESS: CPT | Performed by: PODIATRIST

## 2023-11-14 PROCEDURE — 99214 OFFICE O/P EST MOD 30 MIN: CPT | Performed by: PODIATRIST

## 2023-11-14 PROCEDURE — 1123F ACP DISCUSS/DSCN MKR DOCD: CPT | Performed by: PODIATRIST

## 2023-11-14 PROCEDURE — G8419 CALC BMI OUT NRM PARAM NOF/U: HCPCS | Performed by: PODIATRIST

## 2023-11-14 PROCEDURE — 1036F TOBACCO NON-USER: CPT | Performed by: PODIATRIST

## 2023-11-14 RX ORDER — HYDRALAZINE HYDROCHLORIDE 50 MG/1
1 TABLET, FILM COATED ORAL DAILY
COMMUNITY
Start: 2023-10-14

## 2023-11-14 RX ORDER — CARVEDILOL 3.12 MG/1
3.12 TABLET ORAL 2 TIMES DAILY
Qty: 60 TABLET | Refills: 11 | COMMUNITY
Start: 2023-09-28 | End: 2024-09-27

## 2023-11-14 RX ORDER — DOXYCYCLINE HYCLATE 100 MG
1 TABLET ORAL DAILY
COMMUNITY

## 2023-11-14 RX ORDER — ATENOLOL 50 MG/1
1 TABLET ORAL DAILY
COMMUNITY

## 2023-11-14 RX ORDER — AMLODIPINE BESYLATE 5 MG/1
1 TABLET ORAL DAILY
COMMUNITY
Start: 2023-10-05

## 2023-11-14 RX ORDER — HYDROCHLOROTHIAZIDE 12.5 MG/1
1 TABLET ORAL DAILY
COMMUNITY
Start: 2023-08-08

## 2023-11-14 ASSESSMENT — ENCOUNTER SYMPTOMS
SHORTNESS OF BREATH: 0
VOMITING: 0
ABDOMINAL PAIN: 0
DIARRHEA: 0

## 2023-11-14 NOTE — PROGRESS NOTES
[facility]         Patient Name: Storm Aguiar    : 1945    Visit Date: 2023    Office Visit Note      Subjective:         Patient is a 70 y.o. female who is being seen in office follow up visit for pain to the right foot. Patient states he continues to have pain. Patient presents with x-ray to discuss results. Patient is also on gabapentin 300 mg 3 times a day daily for neuropathy. Past Medical History:   Diagnosis Date    Back pain     Herniated disc, cervical     Hyperlipemia     Hypertension     Osteoarthritis     Scoliosis     Shingles     Shoulder pain     Spinal stenosis     Torn meniscus     Vaginal prolapse     Vertigo      Past Surgical History:   Procedure Laterality Date    HYSTERECTOMY (CERVIX STATUS UNKNOWN)         Family History   Problem Relation Age of Onset    Cancer Mother     Heart Disease Brother       Social History     Tobacco Use    Smoking status: Never    Smokeless tobacco: Never   Substance Use Topics    Alcohol use: Never     Allergies   Allergen Reactions    Ciprofloxacin Rash    Penicillins Rash     Prior to Admission medications    Medication Sig Start Date End Date Taking?  Authorizing Provider   atenolol (TENORMIN) 50 MG tablet Take 1 tablet by mouth daily   Yes ProviderRodriguez MD   carvedilol (COREG) 3.125 MG tablet Take 1 tablet by mouth 2 times daily 23 Yes ProviderRodriguez MD   doxycycline hyclate (VIBRA-TABS) 100 MG tablet Take 1 tablet by mouth daily   Yes ProviderRodriguez MD   hydrALAZINE (APRESOLINE) 50 MG tablet Take 1 tablet by mouth daily 10/14/23  Yes Rodriguez Leonard MD   hydroCHLOROthiazide (HYDRODIURIL) 12.5 MG tablet Take 1 tablet by mouth daily 23  Yes ProviderRodriguez MD   aspirin 81 MG chewable tablet Take 1 tablet by mouth daily 23  Yes Jt Saleh MD   atorvastatin (LIPITOR) 40 MG tablet Take 1 tablet by mouth nightly 23  Yes Jt Saleh MD   escitalopram

## 2023-11-15 ENCOUNTER — TELEPHONE (OUTPATIENT)
Age: 78
End: 2023-11-15

## 2023-11-15 NOTE — TELEPHONE ENCOUNTER
Surgery scheduled for 12-01-23 @ 7:30am.    Left message on voicemail for patient to call the office. Patient needs to get clearance from her PCP. 2021 23:11

## 2023-11-15 NOTE — TELEPHONE ENCOUNTER
Faxed patient information to surgery scheduling. Waiting for response of surgery time with confirmation.

## 2023-11-30 NOTE — TELEPHONE ENCOUNTER
MARLIN PHILLIPS 11-28-23 @ 3:09pm    Left another message today for patient to call office today. I do not have PCP clearance and if not cleared, surgery will need to be rescheduled.

## 2023-11-30 NOTE — TELEPHONE ENCOUNTER
Was unable to reach patient. I did talk to daughter-in-law who asked me to contact her son about patient's surgery tomorrow. Spoke with Pankaj Mckenna and let him know we were waiting on PCP clearance, but did not receive it. Pankaj Mckenna states she is now living with is brother. I told Royergretchen Mckenna if they wish to reschedule surgery, we will need clearance. Pankaj Mckenna verbalized understanding. Called OR and cancelled case; notified Dr. Mickey Díaz.

## 2024-06-04 ENCOUNTER — HOSPITAL ENCOUNTER (EMERGENCY)
Facility: HOSPITAL | Age: 79
Discharge: HOME | End: 2024-06-04
Attending: STUDENT IN AN ORGANIZED HEALTH CARE EDUCATION/TRAINING PROGRAM
Payer: MEDICARE

## 2024-06-04 ENCOUNTER — APPOINTMENT (OUTPATIENT)
Facility: HOSPITAL | Age: 79
End: 2024-06-04
Payer: MEDICARE

## 2024-06-04 VITALS
TEMPERATURE: 97.8 F | BODY MASS INDEX: 25.16 KG/M2 | HEIGHT: 63 IN | WEIGHT: 142 LBS | DIASTOLIC BLOOD PRESSURE: 56 MMHG | OXYGEN SATURATION: 94 % | SYSTOLIC BLOOD PRESSURE: 135 MMHG | HEART RATE: 95 BPM | RESPIRATION RATE: 16 BRPM

## 2024-06-04 DIAGNOSIS — G89.29 ACUTE EXACERBATION OF CHRONIC LOW BACK PAIN: ICD-10-CM

## 2024-06-04 DIAGNOSIS — M54.50 ACUTE EXACERBATION OF CHRONIC LOW BACK PAIN: ICD-10-CM
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
## 2024-06-04 LAB
ANION GAP SERPL CALC-SCNC: 5 MMOL/L (ref 5–15)
APPEARANCE UR: CLEAR
BACTERIA URNS QL MICRO: NEGATIVE /HPF
BASOPHILS # BLD: 0 K/UL (ref 0–0.1)
BASOPHILS NFR BLD: 0 % (ref 0–1)
BILIRUB UR QL: NEGATIVE
BUN SERPL-MCNC: 42 MG/DL (ref 6–20)
BUN/CREAT SERPL: 32 (ref 12–20)
CALCIUM SERPL-MCNC: 11 MG/DL (ref 8.5–10.1)
CHLORIDE SERPL-SCNC: 106 MMOL/L (ref 97–108)
CK SERPL-CCNC: 104 U/L (ref 26–192)
CO2 SERPL-SCNC: 23 MMOL/L (ref 21–32)
COLOR UR: NORMAL
CREAT SERPL-MCNC: 1.31 MG/DL (ref 0.55–1.02)
DIFFERENTIAL METHOD BLD: ABNORMAL
EOSINOPHIL # BLD: 0.3 K/UL (ref 0–0.4)
EOSINOPHIL NFR BLD: 2 % (ref 0–7)
EPITH CASTS URNS QL MICRO: NORMAL /LPF
ERYTHROCYTE [DISTWIDTH] IN BLOOD BY AUTOMATED COUNT: 14 % (ref 11.5–14.5)
GLUCOSE SERPL-MCNC: 134 MG/DL (ref 65–100)
GLUCOSE UR STRIP.AUTO-MCNC: NEGATIVE MG/DL
HCT VFR BLD AUTO: 32.4 % (ref 35–47)
HGB BLD-MCNC: 11.2 G/DL (ref 11.5–16)
HGB UR QL STRIP: NEGATIVE
HYALINE CASTS URNS QL MICRO: NORMAL /LPF (ref 0–2)
IMM GRANULOCYTES # BLD AUTO: 0 K/UL (ref 0–0.04)
IMM GRANULOCYTES NFR BLD AUTO: 0 % (ref 0–0.5)
KETONES UR QL STRIP.AUTO: NEGATIVE MG/DL
LEUKOCYTE ESTERASE UR QL STRIP.AUTO: NEGATIVE
LYMPHOCYTES # BLD: 1.6 K/UL (ref 0.8–3.5)
LYMPHOCYTES NFR BLD: 14 % (ref 12–49)
MCH RBC QN AUTO: 30.9 PG (ref 26–34)
MCHC RBC AUTO-ENTMCNC: 34.6 G/DL (ref 30–36.5)
MCV RBC AUTO: 89.5 FL (ref 80–99)
MONOCYTES # BLD: 1 K/UL (ref 0–1)
MONOCYTES NFR BLD: 9 % (ref 5–13)
NEUTS SEG # BLD: 8.7 K/UL (ref 1.8–8)
NEUTS SEG NFR BLD: 75 % (ref 32–75)
NITRITE UR QL STRIP.AUTO: NEGATIVE
NRBC # BLD: 0 K/UL (ref 0–0.01)
NRBC BLD-RTO: 0 PER 100 WBC
PH UR STRIP: 5 (ref 5–8)
PLATELET # BLD AUTO: 340 K/UL (ref 150–400)
PMV BLD AUTO: 10.3 FL (ref 8.9–12.9)
POTASSIUM SERPL-SCNC: 4.3 MMOL/L (ref 3.5–5.1)
PROT UR STRIP-MCNC: NEGATIVE MG/DL
RBC # BLD AUTO: 3.62 M/UL (ref 3.8–5.2)
RBC #/AREA URNS HPF: NORMAL /HPF (ref 0–5)
SODIUM SERPL-SCNC: 134 MMOL/L (ref 136–145)
SP GR UR REFRACTOMETRY: 1.01 (ref 1–1.03)
TROPONIN I SERPL HS-MCNC: 20 NG/L (ref 0–51)
URINE CULTURE IF INDICATED: NORMAL
UROBILINOGEN UR QL STRIP.AUTO: 0.2 EU/DL (ref 0.2–1)
WBC # BLD AUTO: 11.7 K/UL (ref 3.6–11)
WBC URNS QL MICRO: NORMAL /HPF (ref 0–4)

## 2024-06-04 PROCEDURE — 72125 CT NECK SPINE W/O DYE: CPT

## 2024-06-04 PROCEDURE — 2580000003 HC RX 258: Performed by: EMERGENCY MEDICINE

## 2024-06-04 PROCEDURE — 96374 THER/PROPH/DIAG INJ IV PUSH: CPT

## 2024-06-04 PROCEDURE — 81001 URINALYSIS AUTO W/SCOPE: CPT

## 2024-06-04 PROCEDURE — 36415 COLL VENOUS BLD VENIPUNCTURE: CPT

## 2024-06-04 PROCEDURE — 72128 CT CHEST SPINE W/O DYE: CPT

## 2024-06-04 PROCEDURE — 80048 BASIC METABOLIC PNL TOTAL CA: CPT

## 2024-06-04 PROCEDURE — 6370000000 HC RX 637 (ALT 250 FOR IP): Performed by: STUDENT IN AN ORGANIZED HEALTH CARE EDUCATION/TRAINING PROGRAM

## 2024-06-04 PROCEDURE — 93005 ELECTROCARDIOGRAM TRACING: CPT | Performed by: STUDENT IN AN ORGANIZED HEALTH CARE EDUCATION/TRAINING PROGRAM

## 2024-06-04 PROCEDURE — 84484 ASSAY OF TROPONIN QUANT: CPT

## 2024-06-04 PROCEDURE — 82550 ASSAY OF CK (CPK): CPT

## 2024-06-04 PROCEDURE — 72192 CT PELVIS W/O DYE: CPT

## 2024-06-04 PROCEDURE — 99284 EMERGENCY DEPT VISIT MOD MDM: CPT

## 2024-06-04 PROCEDURE — 72131 CT LUMBAR SPINE W/O DYE: CPT

## 2024-06-04 PROCEDURE — 70450 CT HEAD/BRAIN W/O DYE: CPT

## 2024-06-04 PROCEDURE — 85025 COMPLETE CBC W/AUTO DIFF WBC: CPT

## 2024-06-04 PROCEDURE — 6360000002 HC RX W HCPCS: Performed by: STUDENT IN AN ORGANIZED HEALTH CARE EDUCATION/TRAINING PROGRAM

## 2024-06-04 RX ORDER — CYCLOBENZAPRINE HCL 10 MG
10 TABLET ORAL ONCE AS NEEDED
Status: COMPLETED | OUTPATIENT
Start: 2024-06-04 | End: 2024-06-04

## 2024-06-04 RX ORDER — 0.9 % SODIUM CHLORIDE 0.9 %
1000 INTRAVENOUS SOLUTION INTRAVENOUS ONCE
Status: COMPLETED | OUTPATIENT
Start: 2024-06-04 | End: 2024-06-04

## 2024-06-04 RX ORDER — MORPHINE SULFATE 2 MG/ML
2 INJECTION, SOLUTION INTRAMUSCULAR; INTRAVENOUS
Status: COMPLETED | OUTPATIENT
Start: 2024-06-04 | End: 2024-06-04

## 2024-06-04 RX ADMIN — CYCLOBENZAPRINE 10 MG: 10 TABLET, FILM COATED ORAL at 06:25

## 2024-06-04 RX ADMIN — SODIUM CHLORIDE 1000 ML: 9 INJECTION, SOLUTION INTRAVENOUS at 07:43

## 2024-06-04 RX ADMIN — MORPHINE SULFATE 2 MG: 2 INJECTION, SOLUTION INTRAMUSCULAR; INTRAVENOUS at 05:46

## 2024-06-04 ASSESSMENT — PAIN DESCRIPTION - LOCATION
LOCATION: BACK

## 2024-06-04 ASSESSMENT — PAIN SCALES - GENERAL
PAINLEVEL_OUTOF10: 10

## 2024-06-04 ASSESSMENT — PAIN DESCRIPTION - DESCRIPTORS: DESCRIPTORS: ACHING

## 2024-06-04 ASSESSMENT — PAIN - FUNCTIONAL ASSESSMENT: PAIN_FUNCTIONAL_ASSESSMENT: 0-10

## 2024-06-04 NOTE — ED TRIAGE NOTES
Pt arrived via EMS , reports back pain due to fall this morning.   Daughter found her by her bed, thinks she might have gotten up to use the bathroom .  Pt doesn't remember the fall, endorses back pain and neck pain   Denies blood thinners

## 2024-06-04 NOTE — ED NOTES
Bedside and Verbal shift change report given to Leigha (oncoming nurse) by Ashley (offgoing nurse). Report included the following information Nurse Handoff Report, ED SBAR, MAR, Recent Results, Quality Measures, and Neuro Assessment.

## 2024-06-04 NOTE — ED NOTES
Pt given discharge instructions per provider and verbalized understanding. Pt wheeled from ED to home with son as  of vehicle.

## 2024-06-04 NOTE — ED PROVIDER NOTES
79-year-old female signed out to me by Dr. Alvarez pending results of her imaging and urinalysis.  Please see previous notes for details.    Imaging shows no evidence of any acute injuries.  Minimal GLORY, and otherwise labs are reassuring.  CK normal as well.  No infection on urinalysis.  She was discharged in stable condition     Guru Warren MD  06/04/24 [id number]    
remainder of the review of systems was reviewed and negative.       PAST MEDICAL HISTORY     Past Medical History:   Diagnosis Date    Back pain     Herniated disc, cervical     Hyperlipemia     Hypertension     Osteoarthritis     Scoliosis     Shingles     Shoulder pain     Spinal stenosis     Torn meniscus     Vaginal prolapse     Vertigo          SURGICAL HISTORY       Past Surgical History:   Procedure Laterality Date    HYSTERECTOMY (CERVIX STATUS UNKNOWN)           CURRENT MEDICATIONS       Previous Medications    AMLODIPINE (NORVASC) 5 MG TABLET    Take 1 tablet by mouth daily    ASPIRIN 81 MG CHEWABLE TABLET    Take 1 tablet by mouth daily    ATENOLOL (TENORMIN) 50 MG TABLET    Take 1 tablet by mouth daily    ATORVASTATIN (LIPITOR) 40 MG TABLET    Take 1 tablet by mouth nightly    CALCIUM CARBONATE 1500 (600 CA) MG TABS TABLET    Take 1 tablet by mouth 2 times daily    CARVEDILOL (COREG) 3.125 MG TABLET    Take 1 tablet by mouth 2 times daily    DICLOFENAC SODIUM (VOLTAREN) 1 % GEL    Place onto the skin    DOXYCYCLINE HYCLATE (VIBRA-TABS) 100 MG TABLET    Take 1 tablet by mouth daily    ESCITALOPRAM (LEXAPRO) 5 MG TABLET    Take 1 tablet by mouth daily    ESTRADIOL (ESTRACE) 0.5 MG TABLET    Take 1 tablet by mouth daily    FAMOTIDINE (PEPCID) 20 MG TABLET    Take 1 tablet by mouth 2 times daily    GABAPENTIN (NEURONTIN) 300 MG CAPSULE    Take 1 capsule by mouth 3 times daily.    HYDRALAZINE (APRESOLINE) 50 MG TABLET    Take 1 tablet by mouth daily    HYDROCHLOROTHIAZIDE (HYDRODIURIL) 12.5 MG TABLET    Take 1 tablet by mouth daily    METOPROLOL SUCCINATE (TOPROL XL) 50 MG EXTENDED RELEASE TABLET    ceived the following from [facility]: Outside name: metoprolol succinate (TOPROL-XL) 50 mg XL tablet    NORTRIPTYLINE (PAMELOR) 25 MG CAPSULE    Take 1 capsule by mouth nightly       ALLERGIES     Ciprofloxacin and Penicillins    FAMILY HISTORY       Family History   Problem Relation Age of Onset

## 2024-06-05 LAB
EKG ATRIAL RATE: 98 BPM
EKG DIAGNOSIS: NORMAL
EKG P AXIS: 70 DEGREES
EKG P-R INTERVAL: 186 MS
EKG Q-T INTERVAL: 330 MS
EKG QRS DURATION: 88 MS
EKG QTC CALCULATION (BAZETT): 421 MS
EKG R AXIS: 20 DEGREES
EKG T AXIS: 42 DEGREES
EKG VENTRICULAR RATE: 98 BPM

## 2024-06-05 PROCEDURE — 93010 ELECTROCARDIOGRAM REPORT: CPT | Performed by: SPECIALIST
